# Patient Record
Sex: FEMALE | Race: BLACK OR AFRICAN AMERICAN | Employment: FULL TIME | URBAN - METROPOLITAN AREA
[De-identification: names, ages, dates, MRNs, and addresses within clinical notes are randomized per-mention and may not be internally consistent; named-entity substitution may affect disease eponyms.]

---

## 2018-02-14 ENCOUNTER — HOSPITAL ENCOUNTER (EMERGENCY)
Age: 28
Discharge: HOME | End: 2018-02-14
Attending: FAMILY MEDICINE

## 2018-02-14 VITALS
HEART RATE: 131 BPM | TEMPERATURE: 101.3 F | WEIGHT: 276 LBS | DIASTOLIC BLOOD PRESSURE: 66 MMHG | SYSTOLIC BLOOD PRESSURE: 120 MMHG | HEIGHT: 67 IN | RESPIRATION RATE: 18 BRPM | BODY MASS INDEX: 43.32 KG/M2 | OXYGEN SATURATION: 99 %

## 2018-02-14 DIAGNOSIS — J10.1 INFLUENZA B: ICD-10-CM

## 2018-02-14 DIAGNOSIS — J10.1 INFLUENZA A: Primary | ICD-10-CM

## 2018-02-14 LAB
INFLUENZA A AG (POC): ABNORMAL
INFLUENZA AG (POC) NEGATIVE CTRL.: ABNORMAL
INFLUENZA AG (POC) POSITIVE CTRL.: ABNORMAL
INFLUENZA B AG (POC): ABNORMAL
LOT NUMBER POC: ABNORMAL

## 2018-02-14 RX ORDER — OSELTAMIVIR PHOSPHATE 75 MG/1
75 CAPSULE ORAL 2 TIMES DAILY
Qty: 10 CAP | Refills: 0 | Status: SHIPPED | OUTPATIENT
Start: 2018-02-14 | End: 2018-02-19

## 2018-02-14 RX ORDER — BENZONATATE 200 MG/1
200 CAPSULE ORAL
Qty: 21 CAP | Refills: 0 | Status: SHIPPED | OUTPATIENT
Start: 2018-02-14 | End: 2018-02-21

## 2018-02-14 RX ORDER — IBUPROFEN 200 MG
800 TABLET ORAL
Status: COMPLETED | OUTPATIENT
Start: 2018-02-14 | End: 2018-02-14

## 2018-02-14 RX ADMIN — Medication 800 MG: at 12:52

## 2018-02-14 NOTE — UC PROVIDER NOTE
Patient is a 32 y.o. female presenting with cold symptoms. The history is provided by the patient. Cold Symptoms    This is a new problem. The current episode started yesterday. The problem has not changed since onset. There has been a fever of 101 - 101.9 F. The fever has been present for 1 - 2 days. Associated symptoms include congestion, headaches, rhinorrhea, sneezing and cough. She has tried nothing for the symptoms. Past Medical History:   Diagnosis Date    Allergic rhinitis, cause unspecified     Iron deficiency anemia     during pregnancy    Obesity, Class I, BMI 30-34.9         Past Surgical History:   Procedure Laterality Date    HX  SECTION      for fetal distress (Dr. Adrianna Verdin)         History reviewed. No pertinent family history. Social History     Social History    Marital status: UNKNOWN     Spouse name: N/A    Number of children: 1    Years of education: N/A     Occupational History    [employer] -       Social History Main Topics    Smoking status: Never Smoker    Smokeless tobacco: Never Used    Alcohol use Yes      Comment: social    Drug use: No    Sexual activity: Yes     Partners: Male     Birth control/ protection: IUD     Other Topics Concern    Not on file     Social History Narrative                ALLERGIES: Penicillins    Review of Systems   Constitutional: Positive for chills. HENT: Positive for congestion, rhinorrhea and sneezing. Respiratory: Positive for cough. Neurological: Positive for headaches. All other systems reviewed and are negative. Vitals:    18 1246   BP: 120/66   Pulse: (!) 131   Resp: 18   Temp: (!) 101.3 °F (38.5 °C)   SpO2: 99%   Weight: 125.2 kg (276 lb)   Height: 5' 7\" (1.702 m)       Physical Exam   Constitutional: No distress.    HENT:   Right Ear: Tympanic membrane and ear canal normal.   Left Ear: Tympanic membrane and ear canal normal.   Nose: Nose normal.   Mouth/Throat: No oropharyngeal exudate, posterior oropharyngeal edema or posterior oropharyngeal erythema. Eyes: Conjunctivae are normal. Right eye exhibits no discharge. Left eye exhibits no discharge. Neck: Neck supple. Pulmonary/Chest: Effort normal and breath sounds normal. No respiratory distress. She has no wheezes. She has no rales. Lymphadenopathy:     She has no cervical adenopathy. Skin: No rash noted. Nursing note and vitals reviewed. MDM     Differential Diagnosis; Clinical Impression; Plan:     CLINICAL IMPRESSION:  Influenza A  (primary encounter diagnosis)  Influenza B      DDX    Plan:    Tamiflu- other OTC Rx as needed  Fluids and rest   Amount and/or Complexity of Data Reviewed:   Clinical lab tests:  Ordered   Review and summarize past medical records:  Yes  Risk of Significant Complications, Morbidity, and/or Mortality:   Presenting problems: Moderate  Diagnostic procedures: Moderate  Management options:   Moderate  Progress:   Patient progress:  Stable      Procedures

## 2018-02-14 NOTE — DISCHARGE INSTRUCTIONS

## 2018-02-14 NOTE — LETTER
[address] [facility] [address] 
[phone] Work/School Note Date: 2/14/2018 To Whom It May concern: 
 
Rene Madison was seen and treated today in the urgent care center by the following provider(s): 
Attending Provider: Anastasia Ochoa MD.   
 
Rene Madison may return to work on 2/19/18. Sincerely, Anastasia Ochoa MD

## 2019-06-28 ENCOUNTER — APPOINTMENT (OUTPATIENT)
Dept: CT IMAGING | Age: 29
DRG: 815 | End: 2019-06-28
Attending: EMERGENCY MEDICINE
Payer: COMMERCIAL

## 2019-06-28 ENCOUNTER — HOSPITAL ENCOUNTER (INPATIENT)
Age: 29
LOS: 4 days | Discharge: HOME | DRG: 815 | End: 2019-07-02
Attending: EMERGENCY MEDICINE | Admitting: SURGERY
Payer: COMMERCIAL

## 2019-06-28 DIAGNOSIS — S36.039A LACERATION OF SPLEEN, INITIAL ENCOUNTER: Primary | ICD-10-CM

## 2019-06-28 DIAGNOSIS — E83.51 HYPOCALCEMIA: ICD-10-CM

## 2019-06-28 DIAGNOSIS — R07.9 ACUTE CHEST PAIN: ICD-10-CM

## 2019-06-28 DIAGNOSIS — D64.9 SYMPTOMATIC ANEMIA: ICD-10-CM

## 2019-06-28 DIAGNOSIS — R00.0 SINUS TACHYCARDIA: ICD-10-CM

## 2019-06-28 DIAGNOSIS — E87.6 ACUTE HYPOKALEMIA: ICD-10-CM

## 2019-06-28 LAB
ABO + RH BLD: NORMAL
ALBUMIN SERPL-MCNC: 3 G/DL (ref 3.5–5)
ALBUMIN/GLOB SERPL: 0.8 {RATIO} (ref 1.1–2.2)
ALP SERPL-CCNC: 127 U/L (ref 45–117)
ALT SERPL-CCNC: 207 U/L (ref 12–78)
AMPHET UR QL SCN: NEGATIVE
ANION GAP BLD CALC-SCNC: 19 MMOL/L (ref 10–20)
ANION GAP SERPL CALC-SCNC: 8 MMOL/L (ref 5–15)
APPEARANCE UR: ABNORMAL
AST SERPL-CCNC: 92 U/L (ref 15–37)
ATRIAL RATE: 128 BPM
BACTERIA URNS QL MICRO: NEGATIVE /HPF
BARBITURATES UR QL SCN: NEGATIVE
BASOPHILS # BLD: 0.1 K/UL (ref 0–0.1)
BASOPHILS NFR BLD: 1 % (ref 0–1)
BENZODIAZ UR QL: NEGATIVE
BILIRUB SERPL-MCNC: 0.5 MG/DL (ref 0.2–1)
BILIRUB UR QL CFM: NEGATIVE
BLOOD GROUP ANTIBODIES SERPL: NORMAL
BNP SERPL-MCNC: 20 PG/ML
BUN BLD-MCNC: 5 MG/DL (ref 9–20)
BUN SERPL-MCNC: 7 MG/DL (ref 6–20)
BUN/CREAT SERPL: 7 (ref 12–20)
CA-I BLD-MCNC: 1.13 MMOL/L (ref 1.12–1.32)
CALCIUM SERPL-MCNC: 8.1 MG/DL (ref 8.5–10.1)
CALCULATED P AXIS, ECG09: 38 DEGREES
CALCULATED R AXIS, ECG10: -24 DEGREES
CALCULATED T AXIS, ECG11: 9 DEGREES
CANNABINOIDS UR QL SCN: NEGATIVE
CHLORIDE BLD-SCNC: 98 MMOL/L (ref 98–107)
CHLORIDE SERPL-SCNC: 102 MMOL/L (ref 97–108)
CK SERPL-CCNC: 62 U/L (ref 26–192)
CO2 BLD-SCNC: 23 MMOL/L (ref 21–32)
CO2 SERPL-SCNC: 24 MMOL/L (ref 21–32)
COCAINE UR QL SCN: NEGATIVE
COLOR UR: ABNORMAL
COMMENT, HOLDF: NORMAL
CREAT BLD-MCNC: 0.9 MG/DL (ref 0.6–1.3)
CREAT SERPL-MCNC: 1 MG/DL (ref 0.55–1.02)
DIAGNOSIS, 93000: NORMAL
DIFFERENTIAL METHOD BLD: ABNORMAL
DRUG SCRN COMMENT,DRGCM: NORMAL
EOSINOPHIL # BLD: 0 K/UL (ref 0–0.4)
EOSINOPHIL NFR BLD: 0 % (ref 0–7)
EPITH CASTS URNS QL MICRO: ABNORMAL /LPF
ERYTHROCYTE [DISTWIDTH] IN BLOOD BY AUTOMATED COUNT: 13.8 % (ref 11.5–14.5)
GLOBULIN SER CALC-MCNC: 3.7 G/DL (ref 2–4)
GLUCOSE BLD-MCNC: 154 MG/DL (ref 65–100)
GLUCOSE SERPL-MCNC: 149 MG/DL (ref 65–100)
GLUCOSE UR STRIP.AUTO-MCNC: NEGATIVE MG/DL
HCG UR QL: NEGATIVE
HCT VFR BLD AUTO: 28.2 % (ref 35–47)
HCT VFR BLD AUTO: 32.5 % (ref 35–47)
HCT VFR BLD AUTO: 34.1 % (ref 35–47)
HCT VFR BLD CALC: 35 % (ref 35–47)
HETEROPH AB SER QL: NEGATIVE
HGB BLD-MCNC: 10 G/DL (ref 11.5–16)
HGB BLD-MCNC: 10.7 G/DL (ref 11.5–16)
HGB BLD-MCNC: 11.3 G/DL (ref 11.5–16)
HGB BLD-MCNC: 9.2 G/DL (ref 11.5–16)
HGB UR QL STRIP: NEGATIVE
IMM GRANULOCYTES # BLD AUTO: 0 K/UL (ref 0–0.04)
IMM GRANULOCYTES NFR BLD AUTO: 0 % (ref 0–0.5)
KETONES UR QL STRIP.AUTO: ABNORMAL MG/DL
LACTATE SERPL-SCNC: 1.4 MMOL/L (ref 0.4–2)
LEUKOCYTE ESTERASE UR QL STRIP.AUTO: ABNORMAL
LIPASE SERPL-CCNC: 58 U/L (ref 73–393)
LYMPHOCYTES # BLD: 3.6 K/UL (ref 0.8–3.5)
LYMPHOCYTES NFR BLD: 35 % (ref 12–49)
MCH RBC QN AUTO: 27.6 PG (ref 26–34)
MCHC RBC AUTO-ENTMCNC: 33.1 G/DL (ref 30–36.5)
MCV RBC AUTO: 83.2 FL (ref 80–99)
METHADONE UR QL: NEGATIVE
MONOCYTES # BLD: 0.7 K/UL (ref 0–1)
MONOCYTES NFR BLD: 7 % (ref 5–13)
NEUTS BAND NFR BLD MANUAL: 1 %
NEUTS SEG # BLD: 5.8 K/UL (ref 1.8–8)
NEUTS SEG NFR BLD: 56 % (ref 32–75)
NITRITE UR QL STRIP.AUTO: NEGATIVE
NRBC # BLD: 0 K/UL (ref 0–0.01)
NRBC BLD-RTO: 0 PER 100 WBC
OPIATES UR QL: NEGATIVE
P-R INTERVAL, ECG05: 122 MS
PCP UR QL: NEGATIVE
PH UR STRIP: 6 [PH] (ref 5–8)
PLATELET # BLD AUTO: 134 K/UL (ref 150–400)
POTASSIUM BLD-SCNC: 3.2 MMOL/L (ref 3.5–5.1)
POTASSIUM SERPL-SCNC: 3.2 MMOL/L (ref 3.5–5.1)
PROT SERPL-MCNC: 6.7 G/DL (ref 6.4–8.2)
PROT UR STRIP-MCNC: 30 MG/DL
Q-T INTERVAL, ECG07: 312 MS
QRS DURATION, ECG06: 72 MS
QTC CALCULATION (BEZET), ECG08: 455 MS
RBC # BLD AUTO: 4.1 M/UL (ref 3.8–5.2)
RBC #/AREA URNS HPF: ABNORMAL /HPF (ref 0–5)
RBC MORPH BLD: ABNORMAL
SAMPLES BEING HELD,HOLD: NORMAL
SERVICE CMNT-IMP: ABNORMAL
SODIUM BLD-SCNC: 136 MMOL/L (ref 136–145)
SODIUM SERPL-SCNC: 134 MMOL/L (ref 136–145)
SP GR UR REFRACTOMETRY: 1.02 (ref 1–1.03)
SPECIMEN EXP DATE BLD: NORMAL
TROPONIN I SERPL-MCNC: <0.05 NG/ML
TSH SERPL DL<=0.05 MIU/L-ACNC: 1.83 UIU/ML (ref 0.36–3.74)
UA: UC IF INDICATED,UAUC: ABNORMAL
UROBILINOGEN UR QL STRIP.AUTO: 1 EU/DL (ref 0.2–1)
VENTRICULAR RATE, ECG03: 128 BPM
WBC # BLD AUTO: 10.2 K/UL (ref 3.6–11)
WBC MORPH BLD: ABNORMAL
WBC URNS QL MICRO: ABNORMAL /HPF (ref 0–4)

## 2019-06-28 PROCEDURE — 96361 HYDRATE IV INFUSION ADD-ON: CPT

## 2019-06-28 PROCEDURE — 74011636320 HC RX REV CODE- 636/320: Performed by: EMERGENCY MEDICINE

## 2019-06-28 PROCEDURE — 80307 DRUG TEST PRSMV CHEM ANLYZR: CPT

## 2019-06-28 PROCEDURE — 74011250636 HC RX REV CODE- 250/636: Performed by: SURGERY

## 2019-06-28 PROCEDURE — 86308 HETEROPHILE ANTIBODY SCREEN: CPT

## 2019-06-28 PROCEDURE — 74011250636 HC RX REV CODE- 250/636: Performed by: EMERGENCY MEDICINE

## 2019-06-28 PROCEDURE — 80053 COMPREHEN METABOLIC PANEL: CPT

## 2019-06-28 PROCEDURE — 74011250637 HC RX REV CODE- 250/637: Performed by: INTERNAL MEDICINE

## 2019-06-28 PROCEDURE — 74011250637 HC RX REV CODE- 250/637: Performed by: EMERGENCY MEDICINE

## 2019-06-28 PROCEDURE — 96374 THER/PROPH/DIAG INJ IV PUSH: CPT

## 2019-06-28 PROCEDURE — 74174 CTA ABD&PLVS W/CONTRAST: CPT

## 2019-06-28 PROCEDURE — 85025 COMPLETE CBC W/AUTO DIFF WBC: CPT

## 2019-06-28 PROCEDURE — 93005 ELECTROCARDIOGRAM TRACING: CPT

## 2019-06-28 PROCEDURE — 83605 ASSAY OF LACTIC ACID: CPT

## 2019-06-28 PROCEDURE — 84484 ASSAY OF TROPONIN QUANT: CPT

## 2019-06-28 PROCEDURE — 82550 ASSAY OF CK (CPK): CPT

## 2019-06-28 PROCEDURE — 84443 ASSAY THYROID STIM HORMONE: CPT

## 2019-06-28 PROCEDURE — 80047 BASIC METABLC PNL IONIZED CA: CPT

## 2019-06-28 PROCEDURE — 99285 EMERGENCY DEPT VISIT HI MDM: CPT

## 2019-06-28 PROCEDURE — 86777 TOXOPLASMA ANTIBODY: CPT

## 2019-06-28 PROCEDURE — 74011250637 HC RX REV CODE- 250/637: Performed by: SURGERY

## 2019-06-28 PROCEDURE — 83690 ASSAY OF LIPASE: CPT

## 2019-06-28 PROCEDURE — 71275 CT ANGIOGRAPHY CHEST: CPT

## 2019-06-28 PROCEDURE — 81001 URINALYSIS AUTO W/SCOPE: CPT

## 2019-06-28 PROCEDURE — 36415 COLL VENOUS BLD VENIPUNCTURE: CPT

## 2019-06-28 PROCEDURE — 83880 ASSAY OF NATRIURETIC PEPTIDE: CPT

## 2019-06-28 PROCEDURE — 85018 HEMOGLOBIN: CPT

## 2019-06-28 PROCEDURE — 81025 URINE PREGNANCY TEST: CPT

## 2019-06-28 PROCEDURE — 65610000006 HC RM INTENSIVE CARE

## 2019-06-28 PROCEDURE — 86900 BLOOD TYPING SEROLOGIC ABO: CPT

## 2019-06-28 RX ORDER — DEXTROSE, SODIUM CHLORIDE, AND POTASSIUM CHLORIDE 5; .45; .15 G/100ML; G/100ML; G/100ML
125 INJECTION INTRAVENOUS CONTINUOUS
Status: DISCONTINUED | OUTPATIENT
Start: 2019-06-28 | End: 2019-06-28

## 2019-06-28 RX ORDER — POTASSIUM CHLORIDE 750 MG/1
40 TABLET, FILM COATED, EXTENDED RELEASE ORAL
Status: COMPLETED | OUTPATIENT
Start: 2019-06-28 | End: 2019-06-28

## 2019-06-28 RX ORDER — POTASSIUM CHLORIDE 7.45 MG/ML
10 INJECTION INTRAVENOUS
Status: COMPLETED | OUTPATIENT
Start: 2019-06-28 | End: 2019-06-28

## 2019-06-28 RX ORDER — SODIUM CHLORIDE 0.9 % (FLUSH) 0.9 %
5-40 SYRINGE (ML) INJECTION EVERY 8 HOURS
Status: DISCONTINUED | OUTPATIENT
Start: 2019-06-28 | End: 2019-07-02 | Stop reason: HOSPADM

## 2019-06-28 RX ORDER — LORAZEPAM 2 MG/ML
1 INJECTION INTRAMUSCULAR ONCE
Status: COMPLETED | OUTPATIENT
Start: 2019-06-28 | End: 2019-06-28

## 2019-06-28 RX ORDER — CYCLOBENZAPRINE HCL 10 MG
10 TABLET ORAL
Status: COMPLETED | OUTPATIENT
Start: 2019-06-28 | End: 2019-06-28

## 2019-06-28 RX ORDER — ONDANSETRON 2 MG/ML
4 INJECTION INTRAMUSCULAR; INTRAVENOUS
Status: DISCONTINUED | OUTPATIENT
Start: 2019-06-28 | End: 2019-07-02 | Stop reason: HOSPADM

## 2019-06-28 RX ORDER — OXYCODONE AND ACETAMINOPHEN 5; 325 MG/1; MG/1
2 TABLET ORAL
Status: DISCONTINUED | OUTPATIENT
Start: 2019-06-28 | End: 2019-07-02 | Stop reason: HOSPADM

## 2019-06-28 RX ORDER — NALOXONE HYDROCHLORIDE 0.4 MG/ML
0.4 INJECTION, SOLUTION INTRAMUSCULAR; INTRAVENOUS; SUBCUTANEOUS AS NEEDED
Status: DISCONTINUED | OUTPATIENT
Start: 2019-06-28 | End: 2019-07-02 | Stop reason: HOSPADM

## 2019-06-28 RX ORDER — SODIUM CHLORIDE 0.9 % (FLUSH) 0.9 %
10 SYRINGE (ML) INJECTION
Status: COMPLETED | OUTPATIENT
Start: 2019-06-28 | End: 2019-06-28

## 2019-06-28 RX ORDER — DIPHENHYDRAMINE HYDROCHLORIDE 50 MG/ML
50 INJECTION, SOLUTION INTRAMUSCULAR; INTRAVENOUS
Status: DISCONTINUED | OUTPATIENT
Start: 2019-06-28 | End: 2019-07-02 | Stop reason: HOSPADM

## 2019-06-28 RX ORDER — SODIUM CHLORIDE 0.9 % (FLUSH) 0.9 %
5-40 SYRINGE (ML) INJECTION AS NEEDED
Status: DISCONTINUED | OUTPATIENT
Start: 2019-06-28 | End: 2019-07-02 | Stop reason: HOSPADM

## 2019-06-28 RX ORDER — POTASSIUM CHLORIDE 750 MG/1
10 TABLET, FILM COATED, EXTENDED RELEASE ORAL 2 TIMES DAILY
Status: DISCONTINUED | OUTPATIENT
Start: 2019-06-28 | End: 2019-06-29 | Stop reason: SDUPTHER

## 2019-06-28 RX ORDER — HYDROMORPHONE HYDROCHLORIDE 1 MG/ML
0.5 INJECTION, SOLUTION INTRAMUSCULAR; INTRAVENOUS; SUBCUTANEOUS
Status: DISCONTINUED | OUTPATIENT
Start: 2019-06-28 | End: 2019-06-28

## 2019-06-28 RX ORDER — LORAZEPAM 2 MG/ML
1 INJECTION INTRAMUSCULAR
Status: DISCONTINUED | OUTPATIENT
Start: 2019-06-28 | End: 2019-06-28

## 2019-06-28 RX ORDER — SODIUM CHLORIDE 9 MG/ML
125 INJECTION, SOLUTION INTRAVENOUS CONTINUOUS
Status: DISCONTINUED | OUTPATIENT
Start: 2019-06-28 | End: 2019-06-30

## 2019-06-28 RX ADMIN — SODIUM CHLORIDE 125 ML/HR: 900 INJECTION, SOLUTION INTRAVENOUS at 08:34

## 2019-06-28 RX ADMIN — IOPAMIDOL 80 ML: 755 INJECTION, SOLUTION INTRAVENOUS at 05:20

## 2019-06-28 RX ADMIN — Medication 10 ML: at 21:15

## 2019-06-28 RX ADMIN — POTASSIUM CHLORIDE 40 MEQ: 750 TABLET, FILM COATED, EXTENDED RELEASE ORAL at 10:45

## 2019-06-28 RX ADMIN — CYCLOBENZAPRINE HYDROCHLORIDE 10 MG: 10 TABLET, FILM COATED ORAL at 04:19

## 2019-06-28 RX ADMIN — Medication 10 ML: at 12:03

## 2019-06-28 RX ADMIN — OXYCODONE AND ACETAMINOPHEN 2 TABLET: 5; 325 TABLET ORAL at 10:45

## 2019-06-28 RX ADMIN — POTASSIUM CHLORIDE 10 MEQ: 10 INJECTION, SOLUTION INTRAVENOUS at 12:03

## 2019-06-28 RX ADMIN — SODIUM CHLORIDE 1000 ML: 900 INJECTION, SOLUTION INTRAVENOUS at 06:13

## 2019-06-28 RX ADMIN — POTASSIUM CHLORIDE 10 MEQ: 750 TABLET, FILM COATED, EXTENDED RELEASE ORAL at 18:16

## 2019-06-28 RX ADMIN — Medication 10 ML: at 05:19

## 2019-06-28 RX ADMIN — Medication 30 ML: at 10:46

## 2019-06-28 RX ADMIN — LORAZEPAM 1 MG: 2 INJECTION INTRAMUSCULAR; INTRAVENOUS at 04:19

## 2019-06-28 RX ADMIN — OXYCODONE AND ACETAMINOPHEN 2 TABLET: 5; 325 TABLET ORAL at 18:16

## 2019-06-28 RX ADMIN — SODIUM CHLORIDE 125 ML/HR: 900 INJECTION, SOLUTION INTRAVENOUS at 21:16

## 2019-06-28 RX ADMIN — SODIUM CHLORIDE 1000 ML: 900 INJECTION, SOLUTION INTRAVENOUS at 04:19

## 2019-06-28 RX ADMIN — Medication 1 AMPULE: at 21:15

## 2019-06-28 RX ADMIN — Medication 10 ML: at 06:11

## 2019-06-28 RX ADMIN — POTASSIUM CHLORIDE 10 MEQ: 10 INJECTION, SOLUTION INTRAVENOUS at 10:45

## 2019-06-28 RX ADMIN — Medication 10 ML: at 18:17

## 2019-06-28 RX ADMIN — POTASSIUM CHLORIDE 10 MEQ: 750 TABLET, FILM COATED, EXTENDED RELEASE ORAL at 12:00

## 2019-06-28 RX ADMIN — IOPAMIDOL 100 ML: 755 INJECTION, SOLUTION INTRAVENOUS at 06:11

## 2019-06-28 NOTE — H&P
Surgery Consult    Subjective:      Viktor Burrows is a 34 y.o. female who is being seen for evaluation of abdominal pain. The pain is located in the LUQ  . Pain is described as dull and aching and measures 10/10 in intensity. Onset of pain was 1 day ago. Aggravating factors include movement, activity and pressure. Alleviating factors include none. Associated symptoms include shortness of breath. Patient states she had N/V last week that was severe. She suffered a ground level fall during a vomiting episode last weeks. Patient Active Problem List    Diagnosis Date Noted    Splenic laceration 2019    Low back strain 2014    Insomnia 2012    Obesity, Class I, BMI 30-34.9 2012    Vitamin D deficiency 2012    Tachycardia 2012     Past Medical History:   Diagnosis Date    Allergic rhinitis, cause unspecified     Iron deficiency anemia     during pregnancy    Obesity, Class I, BMI 30-34.9       Past Surgical History:   Procedure Laterality Date    HX  SECTION      for fetal distress (Dr. Vikash Interiano)      Social History     Tobacco Use    Smoking status: Never Smoker    Smokeless tobacco: Never Used   Substance Use Topics    Alcohol use: Yes     Comment: social      No family history on file.    Current Facility-Administered Medications   Medication Dose Route Frequency    sodium chloride (NS) flush 5-40 mL  5-40 mL IntraVENous Q8H    sodium chloride (NS) flush 5-40 mL  5-40 mL IntraVENous PRN    naloxone (NARCAN) injection 0.4 mg  0.4 mg IntraVENous PRN    ondansetron (ZOFRAN) injection 4 mg  4 mg IntraVENous Q4H PRN    diphenhydrAMINE (BENADRYL) injection 50 mg  50 mg IntraVENous Q6H PRN    0.9% sodium chloride infusion  125 mL/hr IntraVENous CONTINUOUS    oxyCODONE-acetaminophen (PERCOCET) 5-325 mg per tablet 2 Tab  2 Tab Oral Q4H PRN    potassium chloride SR (KLOR-CON 10) tablet 40 mEq  40 mEq Oral NOW      Allergies   Allergen Reactions    Penicillins Rash       Review of Systems:    A comprehensive review of systems was negative except for that written in the History of Present Illness. Objective:        Visit Vitals  /77   Pulse (!) 117   Temp 99.1 °F (37.3 °C)   Resp 18   Ht 5' 7\" (1.702 m)   Wt 124.7 kg (274 lb 14.6 oz)   SpO2 100%   BMI 43.06 kg/m²       Physical Exam:  GENERAL: alert, cooperative, no distress, appears stated age, EYE: conjunctivae/corneas clear. , EOM's intact., LUNG: clear to auscultation bilaterally, HEART: regular rate and rhythm, S1, S2 normal, no murmur, click, rub or gallop, ABDOMEN: soft, non-tender. Bowel sounds normal. No masses,  no organomegaly    Imaging:  images and reports reviewed    Lab/Data Review:  BMP:   Lab Results   Component Value Date/Time     (L) 06/28/2019 04:08 AM    K 3.2 (L) 06/28/2019 04:08 AM     06/28/2019 04:08 AM    CO2 24 06/28/2019 04:08 AM    AGAP 8 06/28/2019 04:08 AM     (H) 06/28/2019 04:08 AM    BUN 7 06/28/2019 04:08 AM    CREA 1.00 06/28/2019 04:08 AM    GFRAA >60 06/28/2019 04:08 AM    GFRNA >60 06/28/2019 04:08 AM     CBC:   Lab Results   Component Value Date/Time    WBC 10.2 06/28/2019 04:08 AM    HGB 10.7 (L) 06/28/2019 05:43 AM    HCT 32.5 (L) 06/28/2019 05:43 AM     (L) 06/28/2019 04:08 AM         Assessment:     34year old with splenic laceration without active extravasation. Has some tachycardia but is otherwise stable. Plan:     1. I recommend proceeding with bed rest, observation, and serial hemoglobins.

## 2019-06-28 NOTE — ROUTINE PROCESS
Report given to [facility]. SBAR reviewed. Opportunity provided for questions. Preparing for transfer to room.

## 2019-06-28 NOTE — PROGRESS NOTES
Critical care interdisciplinary rounds held on ******(date). Following members present, Pharmacy, Diabetes Treatment, Case Management, Respiratory Therapy, Physical Therapy, Pastoral Care, Heart Failure Management, Palliative Care and Nutrition. Led by ******** and  ................... Karol Morrow Plan of care discussed. See clinical pathway for plan of care and interventions and desired outcomes.

## 2019-06-28 NOTE — PROGRESS NOTES
6/28/2019    INTENSIVIST PROGRESS NOTE:     Patient seen and evaluated, chart reviewed   35 yo female presented with left flank pain after a fall a few days ago  Found to have a splenic laceration  Admitted to ccu for observation  Now pt in CCU in no distress  No acute complaints    ROS: no sob, no cp, left flank pain    Visit Vitals  /77   Pulse (!) 117   Temp 99.1 °F (37.3 °C)   Resp 18   Ht 5' 7\" (1.702 m)   Wt 124.7 kg (274 lb 14.6 oz)   SpO2 100%   BMI 43.06 kg/m²       General: no distress  Eyes: anicteric  HEENT: dry oral mucosa  Neck: FROM  CV: RRR  Lungs: clear  Abd: soft  : no hematuria  Ext: no edema  Skin: no rash  Musculoskeletal: normal inspection  Neuro: non focal    CXR: clear    Chest ct: no asdz    Labs reviewed    A/P:  - splenic laceration; no surgical intervention, serial h/h  - pain control  - clear liquid diet  - monitor in ccu overnight  - Will assist on disposition planning when stable for transfer  Servando Arellano MD

## 2019-06-28 NOTE — PROGRESS NOTES
Reason for Admission:   Splenic laceration                   RRAT Score:          0 low risk           Plan for utilizing home health:      TBD                    Current Advanced Directive/Advance Care Plan: none                         Transition of Care Plan:             1.  Patient in ED bed waiting for inpatient admission  2. Patient prefers to discharge home with family assistance and follow up appointments    Patient is a 34year old female admitted 6/28 for splenic laceration. Patient alert and oriented, resting in bed, no visitors present in room. Demographic information verified and correct. Insurance information verified and correct. Patient lives alone, no home oxygen, no DME, has not used home health in the past.  Patient uses "[facility]" pharamacy on [address]. Patient states she is independent with ADLs and can drive. Patient's father can transport at discharge    Care Management Interventions  PCP Verified by CM: Yes(Byron Sierra DO)  Mode of Transport at Discharge:  Other (see comment)(pt's father can transport at [employer])  Transition of Care Consult (CM Consult): Discharge Planning  Discharge Durable Medical Equipment: No  Physical Therapy Consult: No  Occupational Therapy Consult: No  Speech Therapy Consult: No  Current Support Network: Own Home, Lives Alone(2nd floor apartment, stairs to access)  Confirm Follow Up Transport: Family  Plan discussed with Pt/Family/Caregiver: Yes  Discharge Location  Discharge Placement: [address], RN, [address] [place] [address]  [phone]

## 2019-06-28 NOTE — PROGRESS NOTES
1900- Bedside report received from KASSIE Mccloud    2000- Assessment completed, see charting for details. Patient in bed watching tv and on her phone. Denies pain at this time. Patient turns self. 2200- BP lower the last 2 hours. Talked to charge nurse. Will draw Hgb to see where we are at. Patient is asymptomatic. Still tachycardic    Gown changed, IV leaking and bleeding at the site. Changed fluids over to the other arm and retook BP. SBP 97 now. Will continue to monitor and see results of Hbg that was just sent. 2300- /58.     0000- Assessment completed, see charting for details. Patient sleeping    0108- PRN Percocet given for pain    0530- Critical Ca 6.0. Dr. Patrice Goon called and made aware. No orders received at this time. Waiting for morning rounds for orders. 0630- Bladder scan 317. Asked patient to try to pee and she says she doesn't feel like she has to go right now, but she will try. Will pass on to day shift to scan later if pt has not urinated.      0700- Bedside report given to Gilberto Starkey RN

## 2019-06-28 NOTE — ED PROVIDER NOTES
EMERGENCY DEPARTMENT HISTORY AND PHYSICAL EXAM      Date: 2019  Patient Name: Aida Almonte  Patient Age and Sex: 34 y.o. female    History of Presenting Illness     Chief Complaint   Patient presents with    Shortness of Breath     Seh ahs been feeling bad all week - cold like symptoms - and this evening she has been having trouble with SOb, and left sided chest pain under her left breast. HR in triage 130 to 140's. She ahd an episode where she passed out around midnight tonight.  Chest Pain    Dizziness       History Provided By: Patient    HPI: Aida Almonte, 34 y.o. female with PMHx significant for below presents to the ED with c/o of acute onset of SOB and left sided chest pain under her \"left breast\" onset about two hours PTA. Pt reports passing out tonight as well. Pt states she has been feeling ill all week and had URI sx's last week and also had vomiting and body aches. Tonight she reports the pain at severe intensity, 10/10 and localized left chest. She reports pain with movement and inspiration. Pt denies any other alleviating or exacerbating factors. Additionally, pt specifically denies any recent fever, chills, headache, nausea, vomiting, abdominal pain, lightheadedness, dizziness, numbness, weakness, BLE swelling, heart palpitations, urinary sxs, diarrhea, constipation, melena, hematochezia, cough, or congestion. PCP: Sade Britton DO    There are no other complaints, changes or physical findings at this time.      Past History   Past Medical History:  Past Medical History:   Diagnosis Date    Allergic rhinitis, cause unspecified     Iron deficiency anemia     during pregnancy    Obesity, Class I, BMI 30-34.9        Past Surgical History:  Past Surgical History:   Procedure Laterality Date    HX  SECTION      for fetal distress (Dr. Raheem Mccord)       Family History:  Denies    Social History:  Social History     Tobacco Use    Smoking status: Never Smoker    Smokeless tobacco: Never Used   Substance Use Topics    Alcohol use: Yes     Comment: social    Drug use: No       Allergies: Allergies   Allergen Reactions    Penicillins Rash       Current Medications:  No current facility-administered medications on file prior to encounter. No current outpatient medications on file prior to encounter. Review of Systems   Review of Systems   Constitutional: Positive for fatigue. Negative for chills and fever. HENT: Negative. Negative for congestion, facial swelling, rhinorrhea, sore throat, trouble swallowing and voice change. Eyes: Negative. Respiratory: Positive for shortness of breath. Negative for apnea, cough, chest tightness and wheezing. Cardiovascular: Positive for chest pain. Negative for palpitations and leg swelling. Gastrointestinal: Negative. Negative for abdominal distention, abdominal pain, blood in stool, constipation, diarrhea, nausea and vomiting. Endocrine: Negative. Negative for cold intolerance, heat intolerance and polyuria. Genitourinary: Negative. Negative for difficulty urinating, dysuria, flank pain, frequency, hematuria and urgency. Musculoskeletal: Negative. Negative for arthralgias, back pain, myalgias, neck pain and neck stiffness. Skin: Negative. Negative for color change and rash. Neurological: Positive for syncope and weakness. Negative for dizziness, facial asymmetry, speech difficulty, light-headedness, numbness and headaches. Hematological: Negative. Does not bruise/bleed easily. Psychiatric/Behavioral: Negative. Negative for confusion and self-injury. The patient is not nervous/anxious. Physical Exam   Physical Exam   Constitutional: She is oriented to person, place, and time. She appears well-developed and well-nourished. She appears toxic. She has a sickly appearance. She appears ill. She appears distressed. HENT:   Head: Normocephalic and atraumatic.    Mouth/Throat: Oropharynx is clear and moist. No oropharyngeal exudate. Eyes: Pupils are equal, round, and reactive to light. Conjunctivae and EOM are normal.   Neck: Normal range of motion. Cardiovascular: Regular rhythm and normal heart sounds. Tachycardia present. Exam reveals no gallop and no friction rub. No murmur heard. Pulmonary/Chest: Effort normal and breath sounds normal. No respiratory distress. She has no wheezes. She has no rales. She exhibits no tenderness. Abdominal: Soft. Bowel sounds are normal. She exhibits no distension and no mass. There is no tenderness. There is no rebound and no guarding. Musculoskeletal: Normal range of motion. She exhibits no edema, tenderness or deformity. Neurological: She is alert and oriented to person, place, and time. She displays normal reflexes. No cranial nerve deficit. She exhibits normal muscle tone. Coordination normal.   Skin: Skin is warm. No rash noted. She is not diaphoretic. Psychiatric: She has a normal mood and affect. Nursing note and vitals reviewed.       Diagnostic Study Results     Labs -  Recent Results (from the past 24 hour(s))   EKG, 12 LEAD, INITIAL    Collection Time: 06/28/19  3:57 AM   Result Value Ref Range    Ventricular Rate 128 BPM    Atrial Rate 128 BPM    P-R Interval 122 ms    QRS Duration 72 ms    Q-T Interval 312 ms    QTC Calculation (Bezet) 455 ms    Calculated P Axis 38 degrees    Calculated R Axis -24 degrees    Calculated T Axis 9 degrees    Diagnosis Sinus tachycardia  No previous ECGs available      URINALYSIS W/ REFLEX CULTURE    Collection Time: 06/28/19  4:02 AM   Result Value Ref Range    Color DARK YELLOW      Appearance CLOUDY (A) CLEAR      Specific gravity 1.017 1.003 - 1.030      pH (UA) 6.0 5.0 - 8.0      Protein 30 (A) NEG mg/dL    Glucose NEGATIVE  NEG mg/dL    Ketone TRACE (A) NEG mg/dL    Blood NEGATIVE  NEG      Urobilinogen 1.0 0.2 - 1.0 EU/dL    Nitrites NEGATIVE  NEG      Leukocyte Esterase SMALL (A) NEG WBC 0-4 0 - 4 /hpf    RBC 0-5 0 - 5 /hpf    Epithelial cells MANY (A) FEW /lpf    Bacteria NEGATIVE  NEG /hpf    UA:UC IF INDICATED CULTURE NOT INDICATED BY UA RESULT CNI     DRUG SCREEN, URINE    Collection Time: 06/28/19  4:02 AM   Result Value Ref Range    AMPHETAMINES NEGATIVE  NEG      BARBITURATES NEGATIVE  NEG      BENZODIAZEPINES NEGATIVE  NEG      COCAINE NEGATIVE  NEG      METHADONE NEGATIVE  NEG      OPIATES NEGATIVE  NEG      PCP(PHENCYCLIDINE) NEGATIVE  NEG      THC (TH-CANNABINOL) NEGATIVE  NEG      Drug screen comment (NOTE)    BILIRUBIN, CONFIRM    Collection Time: 06/28/19  4:02 AM   Result Value Ref Range    Bilirubin UA, confirm NEGATIVE  NEG     CBC WITH AUTOMATED DIFF    Collection Time: 06/28/19  4:08 AM   Result Value Ref Range    WBC 10.2 3.6 - 11.0 K/uL    RBC 4.10 3.80 - 5.20 M/uL    HGB 11.3 (L) 11.5 - 16.0 g/dL    HCT 34.1 (L) 35.0 - 47.0 %    MCV 83.2 80.0 - 99.0 FL    MCH 27.6 26.0 - 34.0 PG    MCHC 33.1 30.0 - 36.5 g/dL    RDW 13.8 11.5 - 14.5 %    PLATELET 859 (L) 851 - 400 K/uL    NRBC 0.0 0  WBC    ABSOLUTE NRBC 0.00 0.00 - 0.01 K/uL    NEUTROPHILS 56 32 - 75 %    BAND NEUTROPHILS 1 %    LYMPHOCYTES 35 12 - 49 %    MONOCYTES 7 5 - 13 %    EOSINOPHILS 0 0 - 7 %    BASOPHILS 1 0 - 1 %    IMMATURE GRANULOCYTES 0 0.0 - 0.5 %    ABS. NEUTROPHILS 5.8 1.8 - 8.0 K/UL    ABS. LYMPHOCYTES 3.6 (H) 0.8 - 3.5 K/UL    ABS. MONOCYTES 0.7 0.0 - 1.0 K/UL    ABS. EOSINOPHILS 0.0 0.0 - 0.4 K/UL    ABS. BASOPHILS 0.1 0.0 - 0.1 K/UL    ABS. IMM.  GRANS. 0.0 0.00 - 0.04 K/UL    DF AUTOMATED      RBC COMMENTS NORMOCYTIC, NORMOCHROMIC      WBC COMMENTS REACTIVE LYMPHS     METABOLIC PANEL, COMPREHENSIVE    Collection Time: 06/28/19  4:08 AM   Result Value Ref Range    Sodium 134 (L) 136 - 145 mmol/L    Potassium 3.2 (L) 3.5 - 5.1 mmol/L    Chloride 102 97 - 108 mmol/L    CO2 24 21 - 32 mmol/L    Anion gap 8 5 - 15 mmol/L    Glucose 149 (H) 65 - 100 mg/dL    BUN 7 6 - 20 MG/DL    Creatinine 1.00 0.55 - 1.02 MG/DL    BUN/Creatinine ratio 7 (L) 12 - 20      GFR est AA >60 >60 ml/min/1.73m2    GFR est non-AA >60 >60 ml/min/1.73m2    Calcium 8.1 (L) 8.5 - 10.1 MG/DL    Bilirubin, total 0.5 0.2 - 1.0 MG/DL    ALT (SGPT) 207 (H) 12 - 78 U/L    AST (SGOT) 92 (H) 15 - 37 U/L    Alk. phosphatase 127 (H) 45 - 117 U/L    Protein, total 6.7 6.4 - 8.2 g/dL    Albumin 3.0 (L) 3.5 - 5.0 g/dL    Globulin 3.7 2.0 - 4.0 g/dL    A-G Ratio 0.8 (L) 1.1 - 2.2     NT-PRO BNP    Collection Time: 06/28/19  4:08 AM   Result Value Ref Range    NT pro-BNP 20 <125 PG/ML   TROPONIN I    Collection Time: 06/28/19  4:08 AM   Result Value Ref Range    Troponin-I, Qt. <0.05 <0.05 ng/mL   CK W/ REFLX CKMB    Collection Time: 06/28/19  4:08 AM   Result Value Ref Range    CK 62 26 - 192 U/L   SAMPLES BEING HELD    Collection Time: 06/28/19  4:08 AM   Result Value Ref Range    SAMPLES BEING HELD RD BL     COMMENT        Add-on orders for these samples will be processed based on acceptable specimen integrity and analyte stability, which may vary by analyte. TSH 3RD GENERATION    Collection Time: 06/28/19  4:08 AM   Result Value Ref Range    TSH 1.83 0.36 - 3.74 uIU/mL   LIPASE    Collection Time: 06/28/19  4:08 AM   Result Value Ref Range    Lipase 58 (L) 73 - 393 U/L   POC CHEM8    Collection Time: 06/28/19  4:26 AM   Result Value Ref Range    Calcium, ionized (POC) 1.13 1.12 - 1.32 mmol/L    Sodium (POC) 136 136 - 145 mmol/L    Potassium (POC) 3.2 (L) 3.5 - 5.1 mmol/L    Chloride (POC) 98 98 - 107 mmol/L    CO2 (POC) 23 21 - 32 mmol/L    Anion gap (POC) 19 10 - 20 mmol/L    Glucose (POC) 154 (H) 65 - 100 mg/dL    BUN (POC) 5 (L) 9 - 20 mg/dL    Creatinine (POC) 0.9 0.6 - 1.3 mg/dL    GFRAA, POC >60 >60 ml/min/1.73m2    GFRNA, POC >60 >60 ml/min/1.73m2    Hematocrit (POC) 35 35.0 - 47.0 %    Comment Comment Not Indicated.      TYPE & SCREEN    Collection Time: 06/28/19  5:43 AM   Result Value Ref Range    Crossmatch Expiration 07/01/2019 ABO/Rh(D) A POSITIVE     Antibody screen NEG    HGB & HCT    Collection Time: 06/28/19  5:43 AM   Result Value Ref Range    HGB 10.7 (L) 11.5 - 16.0 g/dL    HCT 32.5 (L) 35.0 - 47.0 %   LACTIC ACID    Collection Time: 06/28/19  6:14 AM   Result Value Ref Range    Lactic acid 1.4 0.4 - 2.0 MMOL/L   INFECTIOUS MONO PANEL    Collection Time: 06/28/19  6:24 AM   Result Value Ref Range    EBV Ab VCA, IgG PENDING U/mL    EBV Ab VCA, IgM PENDING U/mL    Cytomegalovirus Ab, IgG PENDING U/mL    CMV Ab, IgM PENDING AU/mL    Toxoplasma gondii AB, IgG, QT PENDING IU/mL    Toxoplasma gondii Ab, IgM, QT PENDING AU/mL   MONONUCLEOSIS SCREEN    Collection Time: 06/28/19  6:24 AM   Result Value Ref Range    Mononucleosis screen NEGATIVE  NEG         Radiologic Studies -   CTA ABD PELV W WO CONT   Final Result   IMPRESSION:    There is no evidence of active extravasation. Splenomegaly with perisplenic crescent of hyperdense fluid. Hyperdense/hemorrhagic fluid extending into the paracolic gutter and into the   pelvis also noted in the right paracolic cutter. Consider spontaneous    perisplenic hemorrhage. No rib fracture or reported history of trauma in this    patient. Continued hemodynamic monitoring is recommended. CTA CHEST W OR W WO CONT   Final Result   IMPRESSION:    There is no proximal pulmonary embolism. There is no aortic aneurysm or dissection. Splenomegaly with perisplenic crescent of hyperdense fluid. Consider spontaneous   perisplenic hemorrhage. No rib fracture or reported history of trauma in this   patient. Hemodynamic monitoring is recommended. CT angiography arterial phase imaging of the abdomen is recommended to evaluate   for possible splenic laceration/splenic injury which is not apparent on this   field-of-view. The findings were called to Dr. Estelle Rodriguez on 6/28/2019 at 5:25 AM. by Dr. Christ Aase.   [id number]        CT Results  (Last 48 hours)               06/28/19 0611  CTA ABD PELV W WO CONT Final result    Impression:  IMPRESSION:    There is no evidence of active extravasation. Splenomegaly with perisplenic crescent of hyperdense fluid. Hyperdense/hemorrhagic fluid extending into the paracolic gutter and into the   pelvis also noted in the right paracolic cutter. Consider spontaneous    perisplenic hemorrhage. No rib fracture or reported history of trauma in this    patient. Continued hemodynamic monitoring is recommended. Narrative:  CLINICAL HISTORY:  Perisplenic hematoma        INDICATION:  Perisplenic hematoma       COMPARISON: None       TECHNIQUE: CT of the abdomen and pelvis with  IV contrast , Isovue-370 is   performed. Axial images from the abdomen to the level of the upper thighs is   obtained. Manual post-processing of the images and coronal reformatting is also   performed. Multiplanar reformatted imaging was performed. Sagittal and coronal reformatting. 3-D Postprocessing of imaging was performed. 3-D MIP reconstructed images were obtained in the coronal plane. CT dose reduction was achieved through use of a standardized protocol tailored   for this examination and automatic exposure control for dose modulation. FINDINGS:    There is a crescent of hyperdense fluid adjacent to the spleen. There is no   evidence of active extravasation. There is no left-sided inferior rib fracture. There is splenomegaly. Hepatic steatosis. High density fluid extends into the   paracolic gutter and into the deep pelvis. There is splenomegaly with the spleen   measuring 14 cm in AP dimension and 14 cm in cranial caudal dimension. The aorta tapers without aneurysm. There is no retroperitoneal adenopathy or   mass. normal appendix. The visualized bowel is normal. There is no free intraperitoneal air. The abdominal aorta is without evidence of aneurysm or dissection. Celiac origin   is within normal limits. SMA is patent. RASHEEDA is patent.  The IVC is normal in   caliber. Portal vein is patent. There are single renal arteries bilaterally. There is no hemodynamically   significant stenosis in the renal arteries. The common iliac arteries are normal.    The external iliac arteries are normal.   The femoral arteries and proximal superficial femoral arteries are normal.   Normal accumulation and excretion of contrast material from the kidneys. There   is an IUD in place. 06/28/19 0519  CTA CHEST W OR W WO CONT Final result    Impression:  IMPRESSION:    There is no proximal pulmonary embolism. There is no aortic aneurysm or dissection. Splenomegaly with perisplenic crescent of hyperdense fluid. Consider spontaneous   perisplenic hemorrhage. No rib fracture or reported history of trauma in this   patient. Hemodynamic monitoring is recommended. CT angiography arterial phase imaging of the abdomen is recommended to evaluate   for possible splenic laceration/splenic injury which is not apparent on this   field-of-view. The findings were called to Dr. Yael Almonte on 6/28/2019 at 5:25 AM. by Dr. Thurston Claude. [id number]       Narrative:  CLINICAL HISTORY: Chest pain, dyspnea       INDICATION: Chest pain and dyspnea       COMPARISON: None       TECHNIQUE: CT of the chest with  IV contrast , Isovue-370 is performed. Axial   images from the thoracic inlet to the level of the upper abdomen are obtained. Manual post-processing of the images and coronal reformatting is also performed. CT dose reduction was achieved through use of a standardized protocol tailored   for this examination and automatic exposure control for dose modulation. Multiplanar reformatted imaging was performed. Sagittal and coronal reformatting. 3-D Postprocessing of imaging was performed. 3-D MIP reconstructed images were obtained in the coronal plane. FINDINGS:    There is no proximal pulmonary embolism. The proximal main pulmonary arteries   are well opacified.  More distal pulmonary arteries are not well evaluated. There is no aortic aneurysm or dissection. Heterogenous appearance of the thyroid gland. Small hiatal hernia. There is splenomegaly. There is perisplenic crescent of hypodensity. Hepatic   steatosis. There is no pleural or pericardial effusion. There is no mediastinal,   axillary or hilar lymphadenopathy. The aorta is normal in course and caliber. The proximal pulmonary arteries are without evidence of filling defects. No   lytic or blastic lesions are identified. The remainder of the upper abdomen   visualized is unremarkable. CXR Results  (Last 48 hours)    None          Medical Decision Making   I am the first provider for this patient. I reviewed the vital signs, available nursing notes, past medical history, past surgical history, family history and social history. Vital Signs-Reviewed the patient's vital signs. Patient Vitals for the past 24 hrs:   Temp Pulse Resp BP SpO2   06/28/19 0830  (!) 117  106/77 100 %   06/28/19 0800  (!) 117  97/82 100 %   06/28/19 0730 99.1 °F (37.3 °C) (!) 116 18 108/63 100 %   06/28/19 0700  (!) 122  113/72 100 %   06/28/19 0630  (!) 118  103/70 100 %   06/28/19 0606    111/72    06/28/19 0530  (!) 123  (!) 85/47 99 %   06/28/19 0430  (!) 125 (!) 34 (!) 86/60 99 %   06/28/19 0345 97.6 °F (36.4 °C) (!) 135 22 102/52 100 %       Pulse Oximetry Analysis - 100% on RA    Cardiac Monitor:   Rate: 135 bpm  Rhythm: Sinus Tachycardia      ED EKG interpretation:  Rhythm: sinus tachycardia; and regular . Rate (approx.): 128; Axis: normal; P wave: normal; QRS interval: normal ; ST/T wave: normal; Other findings: normal. This EKG was interpreted by April Negrete M.D.     Records Reviewed: Nursing Notes, Old Medical Records, Previous electrocardiograms, Previous Radiology Studies and Previous Laboratory Studies    Provider Notes (Medical Decision Making):   DDx includes STEMI, NSTEMI, Angina, PE, Aortic Pathology, Chest Wall Pain, Pleurisy, Pneumonia, GERD/esophagitis, Anxiety. No cough/fever or focal lung findings to suggest pneumonia. No tachycardia, hypoxia or pleuritic component to suggest PE. Pulses symmetric and no extremely elevated BP/asymmetry or classic tearing sensation to suggest Aortic Dissection. Also, no neuro findings. No wretching/forceful vomiting to suggest esophageal disaster. Denies IV drug abuse, has native valves, no fevers/murmurs or skin lesions to suggest endocarditis. Will evaluate with EKG, labs, cardiac enzymes, chest x-ray. Will provide pain control and reassess. ED Course:   Initial assessment performed. The patients presenting problems have been discussed, and they are in agreement with the care plan formulated and outlined with them. I have encouraged them to ask questions as they arise throughout their visit. ALCOHOL/SUBSTANCE ABUSE COUNSELING:  Upon evaluation, pt endorsed recent alcohol/illicit drug use. For approximately 15 minutes, pt has been counseled on the dangers of alcohol and illicit drug use on their health, and they were encouraged to quit as soon as possible in order to decrease further risks to their health. Pt has conveyed their understanding of the risks involved should they continue to use these products. I reviewed our electronic medical record system for any past medical records that were available that may contribute to the patient's current condition, the nursing notes and vital signs from today's visit.   Krystin Sifuentes MD    Medications Administered During ED Course:  Medications   sodium chloride (NS) flush 5-40 mL (10 mL IntraVENous Given 7/1/19 0600)   sodium chloride (NS) flush 5-40 mL (30 mL IntraVENous Given 6/28/19 1046)   naloxone (NARCAN) injection 0.4 mg (has no administration in time range)   ondansetron (ZOFRAN) injection 4 mg (has no administration in time range)   diphenhydrAMINE (BENADRYL) injection 50 mg (has no administration in time range)   oxyCODONE-acetaminophen (PERCOCET) 5-325 mg per tablet 2 Tab (2 Tabs Oral Given 7/1/19 0845)   potassium chloride SR (KLOR-CON 10) tablet 20 mEq (20 mEq Oral Given 7/1/19 0845)   acetaminophen (TYLENOL) tablet 650 mg (650 mg Oral Given 6/29/19 2038)   LORazepam (ATIVAN) injection 1 mg (1 mg IntraVENous Given 6/28/19 0419)   sodium chloride 0.9 % bolus infusion 1,000 mL (0 mL IntraVENous IV Completed 6/28/19 0730)   cyclobenzaprine (FLEXERIL) tablet 10 mg (10 mg Oral Given 6/28/19 0419)   sodium chloride (NS) flush 10 mL (10 mL IntraVENous Given 6/28/19 0519)   iopamidol (ISOVUE-370) 76 % injection 80 mL (80 mL IntraVENous Given 6/28/19 0520)   sodium chloride (NS) flush 10 mL (10 mL IntraVENous Given 6/28/19 0611)   iopamidol (ISOVUE-370) 76 % injection 100 mL (100 mL IntraVENous Given 6/28/19 0611)   sodium chloride 0.9 % bolus infusion 1,000 mL (0 mL IntraVENous IV Completed 6/28/19 0732)   potassium chloride SR (KLOR-CON 10) tablet 40 mEq (40 mEq Oral Given 6/28/19 1045)   potassium chloride 10 mEq in 100 ml IVPB (10 mEq IntraVENous New Bag 6/28/19 1203)   calcium gluconate 2 g in 0.9% sodium chloride 100 mL IVPB (0 g IntraVENous Transfusion Completed 6/30/19 0906)   potassium chloride 10 mEq in 100 ml IVPB (0 mEq IntraVENous IV Completed 6/30/19 0906)   sodium chloride 0.9 % bolus infusion 500 mL (0 mL IntraVENous IV Completed 6/30/19 0906)   potassium chloride SR (KLOR-CON 10) tablet 10 mEq (10 mEq Oral Given 6/29/19 1300)   cosyntropin (CORTROSYN) 0.25 mg in sodium chloride 0.9% TEST injection (0.25 mg IntraVENous Given 6/30/19 1105)     ED Course as of Jun 28 0928 Fri Jun 28, 2019   0542 Warts to me that last week she had a syncopal episode. She states that she fell as she was feeling dizzy. She laid on her stomach but denied any abdominal pain at that time. She said prior to the fall she has been had a GI bug with vomiting. She denies any diarrhea.   She denies any history of sickle cell disease. I discussed with her the CT findings for concern for splenic laceration. She is now hypotensive and tachycardic. Will give a second liter of fluids. Will obtain CTA of the abdomen pelvis. Patient will need to be admitted and will resuscitate here in the ER for possible blood transfusion. [HW]      ED Course User Index  [HW] Todd Ramirez MD     Progress Note:  Pt remains tachycardia, BP trending low, will repeat Hgb and give 2nd liter of IVF bolus    Progress Note:  Splenic laceration noted on CT, will keep NPO, consult Surgery    Progress Note:  Pt now drowsy, requiring 2L of oxygen after IV ativan and pain medications, hold on further narcotics, monitor closely. Consult Note:  Russ Mills MD spoke with Dr. Katherin Rodriguez,   Specialty: Hospitalist  Discussed pt's hx, disposition, and available diagnostic and imaging results. Reviewed care plans. Agree with management and plan thus far. Consultant will evaluate pt for admission. Progress Note:  Pt re-examined, pain improved, will be going to ICU for closer monitoring. Critical Care Time:   CRITICAL CARE NOTE :    IMPENDING DETERIORATION -Airway, Respiratory, Cardiovascular, Metabolic and Renal  ASSOCIATED RISK FACTORS - Hypotension, Shock, Hypoxia, Dysrhythmia, Metabolic changes and Dehydration  MANAGEMENT- Bedside Assessment and Supervision of Care  INTERPRETATION -  Xrays, CT Scan, Blood Gases, ECG, Blood Pressure and Cardiac Output Measures   INTERVENTIONS - hemodynamic mngmt and Metobolic interventions  CASE REVIEW - Medical Sub-Specialist, Nursing, Family and Surgeon  TREATMENT RESPONSE -Improved  PERFORMED BY - Self    NOTES   :    I have spent 75 minutes of critical care time involved in lab review, consultations with specialist, family decision- making, bedside attention and documentation. During this entire length of time I was immediately available to the patient . Critical Care:   The reason for providing this level of medical care for this critically ill patient was due to a critical illness that impaired one or more vital organ systems, such that there was a high probability of imminent or life threatening deterioration in the patient's condition. This care involved high complexity decision making to assess, manipulate, and support vital system functions, to treat this degree of vital organ system failure, and to prevent further life threatening deterioration of the patients condition. Liborio Coffman MD    Disposition:  ADMIT  Patient is being admitted to the hospital.  The results of their tests and reasons for their admission have been discussed with them and/or available family. They convey agreement and understanding for the need to be admitted and for their admission diagnosis. Consultation has been made with the inpatient physician specialist for hospitalization. Diagnosis     Clinical Impression:   1. Laceration of spleen, initial encounter    2. Sinus tachycardia    3. Hypocalcemia    4. Acute hypokalemia    5. Symptomatic anemia    6. Acute chest pain        Attestation:  I personally performed the services described in this documentation on this date 6/28/2019 for patient Chavez Faulkner. Liborio Coffman MD    Please note that this dictation was completed with [employer], the computer voice recognition software. Quite often unanticipated grammatical, syntax, homophones, and other interpretive errors are inadvertently transcribed by the computer software. Please disregard these errors. Please excuse any errors that have escaped final proofreading. This note will not be viewable in [address]

## 2019-06-28 NOTE — PROGRESS NOTES
Pharmacy Clarification of Prior to Admission Medication Regimen     The patient was interviewed regarding clarification of the prior to admission medication regimen and was questioned regarding use of any other inhalers, topical products, over the counter medications, herbal medications, vitamin products or ophthalmic/nasal/otic medication use. Information Obtained From: Patient    Pertinent Pharmacy Findings:  Patient denied being prescribed any medications. Patient stated she was not taking any OTC medications including Tylenol, Advil, allergies medication, or Vitamins.     PTA medication list was corrected to the following:     None          Thank you,  Moisés Jean, James  Medication History Pharmacy Technician

## 2019-06-28 NOTE — PROGRESS NOTES
0900 TRANSFER - IN REPORT:    Verbal report received from ED(name) on King Jennings  being received from ED bed 16(unit) for routine progression of care      Report consisted of patients Situation, Background, Assessment and   Recommendations(SBAR). Information from the following report(s) SBAR, Kardex, ED Summary, Intake/Output, MAR, Recent Results and Cardiac Rhythm ST was reviewed with the receiving nurse. Opportunity for questions and clarification was provided. Assessment completed upon patients arrival to unit and care assumed. 0940 pt received from ED via stretcher, transferred to bed with assist, mod pain associated with movement. Primary Nurse Vicente Dee RN and Garrett Adler RN performed a dual skin assessment on this patient No impairment noted  Xavier score is 19  Reviewed plan of care, oriented pt to room and reportable s/s to call for. 1100 pain controled with po meds  1200 pt reassessed per flowsheet, pain worse with movement and palpation  1400 pt gretchen ice chips well  1600 pt reassessed per flowsheet, pain decreased, able to move better in bed without severe pain  1800 pt's diet able to be advanced to clear liquid diet, gretchen sips of ginger ale thus far  1900 Bedside and Verbal shift change report given to Francine Kenney (oncoming nurse) by Pierre Harris (offgoing nurse).  Report included the following information SBAR, Kardex, Procedure Summary, Intake/Output, MAR, Recent Results and Cardiac Rhythm ST.

## 2019-06-29 LAB
ANION GAP SERPL CALC-SCNC: 6 MMOL/L (ref 5–15)
APPEARANCE UR: CLEAR
BACTERIA URNS QL MICRO: NEGATIVE /HPF
BILIRUB UR QL: NEGATIVE
BUN SERPL-MCNC: 3 MG/DL (ref 6–20)
BUN/CREAT SERPL: 7 (ref 12–20)
CALCIUM SERPL-MCNC: 6 MG/DL (ref 8.5–10.1)
CHLORIDE SERPL-SCNC: 115 MMOL/L (ref 97–108)
CMV IGG SERPL IA-ACNC: 2 U/ML (ref 0–0.59)
CMV IGM SERPL IA-ACNC: >240 AU/ML (ref 0–29.9)
CO2 SERPL-SCNC: 20 MMOL/L (ref 21–32)
COLOR UR: NORMAL
COMMENT, 096657: ABNORMAL
CREAT SERPL-MCNC: 0.41 MG/DL (ref 0.55–1.02)
EBV VCA IGG SER-ACNC: 53.7 U/ML (ref 0–17.9)
EBV VCA IGM SER-ACNC: <36 U/ML (ref 0–35.9)
EPITH CASTS URNS QL MICRO: NORMAL /LPF
ERYTHROCYTE [DISTWIDTH] IN BLOOD BY AUTOMATED COUNT: 14.5 % (ref 11.5–14.5)
GLUCOSE SERPL-MCNC: 83 MG/DL (ref 65–100)
GLUCOSE UR STRIP.AUTO-MCNC: NEGATIVE MG/DL
HCT VFR BLD AUTO: 30 % (ref 35–47)
HGB BLD-MCNC: 9.5 G/DL (ref 11.5–16)
HGB BLD-MCNC: 9.7 G/DL (ref 11.5–16)
HGB UR QL STRIP: NEGATIVE
HYALINE CASTS URNS QL MICRO: NORMAL /LPF (ref 0–5)
KETONES UR QL STRIP.AUTO: NEGATIVE MG/DL
LEUKOCYTE ESTERASE UR QL STRIP.AUTO: NEGATIVE
MCH RBC QN AUTO: 27.2 PG (ref 26–34)
MCHC RBC AUTO-ENTMCNC: 31.7 G/DL (ref 30–36.5)
MCV RBC AUTO: 86 FL (ref 80–99)
NITRITE UR QL STRIP.AUTO: NEGATIVE
NRBC # BLD: 0 K/UL (ref 0–0.01)
NRBC BLD-RTO: 0 PER 100 WBC
PH UR STRIP: 5.5 [PH] (ref 5–8)
PLATELET # BLD AUTO: 127 K/UL (ref 150–400)
PMV BLD AUTO: 12.4 FL (ref 8.9–12.9)
POTASSIUM SERPL-SCNC: 3 MMOL/L (ref 3.5–5.1)
PROT UR STRIP-MCNC: NEGATIVE MG/DL
RBC # BLD AUTO: 3.49 M/UL (ref 3.8–5.2)
RBC #/AREA URNS HPF: NORMAL /HPF (ref 0–5)
SODIUM SERPL-SCNC: 141 MMOL/L (ref 136–145)
SP GR UR REFRACTOMETRY: 1.01 (ref 1–1.03)
T GONDII IGG SERPL IA-ACNC: <3 IU/ML (ref 0–7.1)
T GONDII IGM SER IA-ACNC: 3.9 AU/ML (ref 0–7.9)
UA: UC IF INDICATED,UAUC: NORMAL
UROBILINOGEN UR QL STRIP.AUTO: 0.2 EU/DL (ref 0.2–1)
WBC # BLD AUTO: 5.6 K/UL (ref 3.6–11)
WBC URNS QL MICRO: NORMAL /HPF (ref 0–4)

## 2019-06-29 PROCEDURE — 87086 URINE CULTURE/COLONY COUNT: CPT

## 2019-06-29 PROCEDURE — 36415 COLL VENOUS BLD VENIPUNCTURE: CPT

## 2019-06-29 PROCEDURE — 74011250637 HC RX REV CODE- 250/637: Performed by: INTERNAL MEDICINE

## 2019-06-29 PROCEDURE — 81001 URINALYSIS AUTO W/SCOPE: CPT

## 2019-06-29 PROCEDURE — 74011250636 HC RX REV CODE- 250/636: Performed by: INTERNAL MEDICINE

## 2019-06-29 PROCEDURE — 65660000000 HC RM CCU STEPDOWN

## 2019-06-29 PROCEDURE — 74011250636 HC RX REV CODE- 250/636: Performed by: SURGERY

## 2019-06-29 PROCEDURE — 74011250636 HC RX REV CODE- 250/636: Performed by: EMERGENCY MEDICINE

## 2019-06-29 PROCEDURE — 76937 US GUIDE VASCULAR ACCESS: CPT

## 2019-06-29 PROCEDURE — 87040 BLOOD CULTURE FOR BACTERIA: CPT

## 2019-06-29 PROCEDURE — 85027 COMPLETE CBC AUTOMATED: CPT

## 2019-06-29 PROCEDURE — 77030038269 HC DRN EXT URIN PURWCK BARD -A

## 2019-06-29 PROCEDURE — 80048 BASIC METABOLIC PNL TOTAL CA: CPT

## 2019-06-29 PROCEDURE — 51798 US URINE CAPACITY MEASURE: CPT

## 2019-06-29 PROCEDURE — 85018 HEMOGLOBIN: CPT

## 2019-06-29 PROCEDURE — 74011250637 HC RX REV CODE- 250/637: Performed by: SURGERY

## 2019-06-29 PROCEDURE — 74011000258 HC RX REV CODE- 258: Performed by: SURGERY

## 2019-06-29 RX ORDER — POTASSIUM CHLORIDE 750 MG/1
20 TABLET, FILM COATED, EXTENDED RELEASE ORAL 2 TIMES DAILY
Status: DISCONTINUED | OUTPATIENT
Start: 2019-06-29 | End: 2019-07-02 | Stop reason: HOSPADM

## 2019-06-29 RX ORDER — POTASSIUM CHLORIDE 750 MG/1
10 TABLET, FILM COATED, EXTENDED RELEASE ORAL ONCE
Status: COMPLETED | OUTPATIENT
Start: 2019-06-29 | End: 2019-06-29

## 2019-06-29 RX ORDER — ACETAMINOPHEN 325 MG/1
650 TABLET ORAL
Status: DISCONTINUED | OUTPATIENT
Start: 2019-06-29 | End: 2019-07-02 | Stop reason: HOSPADM

## 2019-06-29 RX ORDER — POTASSIUM CHLORIDE 750 MG/1
10 TABLET, FILM COATED, EXTENDED RELEASE ORAL 2 TIMES DAILY
Status: DISCONTINUED | OUTPATIENT
Start: 2019-06-29 | End: 2019-06-29

## 2019-06-29 RX ORDER — POTASSIUM CHLORIDE 7.45 MG/ML
10 INJECTION INTRAVENOUS
Status: COMPLETED | OUTPATIENT
Start: 2019-06-29 | End: 2019-06-30

## 2019-06-29 RX ADMIN — POTASSIUM CHLORIDE 10 MEQ: 10 INJECTION, SOLUTION INTRAVENOUS at 12:14

## 2019-06-29 RX ADMIN — OXYCODONE AND ACETAMINOPHEN 2 TABLET: 5; 325 TABLET ORAL at 16:44

## 2019-06-29 RX ADMIN — Medication 10 ML: at 21:45

## 2019-06-29 RX ADMIN — POTASSIUM CHLORIDE 10 MEQ: 10 INJECTION, SOLUTION INTRAVENOUS at 10:35

## 2019-06-29 RX ADMIN — OXYCODONE AND ACETAMINOPHEN 2 TABLET: 5; 325 TABLET ORAL at 01:06

## 2019-06-29 RX ADMIN — CALCIUM GLUCONATE 2 G: 98 INJECTION, SOLUTION INTRAVENOUS at 10:00

## 2019-06-29 RX ADMIN — POTASSIUM CHLORIDE 10 MEQ: 10 TABLET, FILM COATED, EXTENDED RELEASE ORAL at 13:00

## 2019-06-29 RX ADMIN — OXYCODONE AND ACETAMINOPHEN 1 TABLET: 5; 325 TABLET ORAL at 08:42

## 2019-06-29 RX ADMIN — Medication 10 ML: at 05:58

## 2019-06-29 RX ADMIN — Medication 1 AMPULE: at 21:45

## 2019-06-29 RX ADMIN — POTASSIUM CHLORIDE 10 MEQ: 750 TABLET, FILM COATED, EXTENDED RELEASE ORAL at 08:43

## 2019-06-29 RX ADMIN — Medication 40 ML: at 16:49

## 2019-06-29 RX ADMIN — OXYCODONE AND ACETAMINOPHEN 2 TABLET: 5; 325 TABLET ORAL at 21:43

## 2019-06-29 RX ADMIN — POTASSIUM CHLORIDE 10 MEQ: 750 TABLET, FILM COATED, EXTENDED RELEASE ORAL at 10:00

## 2019-06-29 RX ADMIN — SODIUM CHLORIDE 125 ML/HR: 900 INJECTION, SOLUTION INTRAVENOUS at 13:00

## 2019-06-29 RX ADMIN — POTASSIUM CHLORIDE 20 MEQ: 750 TABLET, FILM COATED, EXTENDED RELEASE ORAL at 19:43

## 2019-06-29 RX ADMIN — SODIUM CHLORIDE 125 ML/HR: 900 INJECTION, SOLUTION INTRAVENOUS at 06:14

## 2019-06-29 RX ADMIN — ACETAMINOPHEN 650 MG: 325 TABLET ORAL at 20:38

## 2019-06-29 RX ADMIN — POTASSIUM CHLORIDE 10 MEQ: 10 INJECTION, SOLUTION INTRAVENOUS at 09:15

## 2019-06-29 RX ADMIN — Medication 1 AMPULE: at 08:43

## 2019-06-29 RX ADMIN — SODIUM CHLORIDE 500 ML: 900 INJECTION, SOLUTION INTRAVENOUS at 10:36

## 2019-06-29 RX ADMIN — POTASSIUM CHLORIDE 10 MEQ: 10 INJECTION, SOLUTION INTRAVENOUS at 11:10

## 2019-06-29 NOTE — PROGRESS NOTES
SURGERY PROGRESS NOTE      Admit Date: 2019        Subjective:     Patient feels better than on admission. Denies nausea. Objective:     Visit Vitals  /71   Pulse (!) 122   Temp 98.7 °F (37.1 °C)   Resp 30   Ht 5' 7\" (1.702 m)   Wt 124.7 kg (274 lb 14.6 oz)   SpO2 96%   Breastfeeding? No   BMI 43.06 kg/m²        Temp (24hrs), Av.6 °F (37 °C), Min:98.3 °F (36.8 °C), Max:98.8 °F (37.1 °C)       07 -  1900  In: 0603 [P.O.:480; I.V.:1245]  Out: 600 [Urine:600]  1901 -  0700  In: 5359.2 [P.O.:480; I.V.:4879.2]  Out: 1150 [Urine:1150]    Physical Exam:    General:  alert, cooperative, no distress, appears stated age   Abdomen: soft, bowel sounds active, non-tender     Hgb 11 -> 9.5 ans has been 9.5 over the last 12 hours  Assessment:     Active Problems:    Tachycardia (2012)      Overview: due to mild dehydration vs other      Splenic laceration (2019)    splenic laceration -  Hgb trend is consisted with dilutional as IVF started and now stable. Symptoms better so, my concern for ongoing bleeding is low. Will transfer to stepdown and decrease frequency of Hgb checks. Continue bedrest for another 24 hours. Sinus tachycardia - has been persistent. There is an ER note from 2018 that shows her heart rate is in the 130. She has a problem list item of tachycardia from . I suspected her current sinus tachycardia is unrelated to the spleen.   Will consult hospitalist for evaluation

## 2019-06-29 NOTE — CONSULTS
Hospitalist Consultation Note    NAME:  Farzana Epstein   :   1990   MRN:   [id number]     ATTENDING: Misha Gama MD  PCP:  Smith Stark DO    Date/Time:  2019 10:24 AM      Recommendations/Plan:     Inappropriate Sinus tachycardia   -unclear exact etiology at present. Appears to be chronic issue at least since   Per PCP notes HR ~ 100-115 but pt was not aware about it   BP stable, not orthostatic   TSH normal   Hb 9.5 but stable, doubt it will cause that level of tachycardia. ECG: sinus tachycardia    CTA chest: no PE   She is not in pain   -check echo   -BP in 110s, will not tolerate BB/CCB     Splenic laceration   -mononucleosis screen negative   -management per primary team     Hypocalcemia   -continue to closely monitor  -per PCP notes h/o Vit D deficiency, but pt cannot recall being told about it and is not on Vit D supplement  -check Vit D level    Hypokalemia  -supplement agressively, follow Mg     Anemia  -Acute due to above on chronic iron deficiency anemia   -Hb stable now  -check iron level, if low consider iron IV         Code Status: Full code   DVT Prophylaxis: per primary team     Thank you very much for allowing us to participate in this patient care. We will follow this pt with you. Subjective:   REQUESTING PHYSICIAN: Dr Wiliam Gaucher: sinus tachycardia   Cindy Gale is a 34 y.o.  female who I was asked to see for above problem. Pt presented on  with abdominal pain. CT revealed perisplenic hemorrhage. She was admitted under surgical services. It is not clear if spleen laceration was spontaneous or traumatic. She sustained a ground level fall during a vomiting episode last week. Since admission HR remain in 120-130s. Initially tachycardia was attributed to dehydration/pain. Pt is clinically getting better but tachycardia persist.   Per chart review tachycardia appears to be a chronic problem at least since .  From PCP notes HR ~ 100-115. From ER note 2018, HR was 130 at that time. Pt is not aware of this problem. She never was evaluated by cardiology in the past.     Past Medical History:   Diagnosis Date    Allergic rhinitis, cause unspecified     Iron deficiency anemia     during pregnancy    Obesity, Class I, BMI 30-34.9       Past Surgical History:   Procedure Laterality Date    HX  SECTION      for fetal distress (Dr. Trish Myers)     Social History     Tobacco Use    Smoking status: Never Smoker    Smokeless tobacco: Never Used   Substance Use Topics    Alcohol use: Yes     Comment: social      Family history:  No h/o tachycardia       Allergies   Allergen Reactions    Penicillins Rash      Prior to Admission medications    Not on File   none per pt     REVIEW OF SYSTEMS:     Total of 12 systems reviewed as follows:   I am not able to complete the review of systems because:    The patient is intubated and sedated    The patient has altered mental status due to his acute medical problems    The patient has baseline aphasia from prior stroke(s)    The patient has baseline dementia and is not reliable historian                 POSITIVE= underlined text  Negative = text not underlined  General:  fever, chills, sweats, generalized weakness, weight loss/gain,      loss of appetite   Eyes:    blurred vision, eye pain, loss of vision, double vision  ENT:    rhinorrhea, pharyngitis   Respiratory:   cough, sputum production, SOB, wheezing, [place], pleuritic pain   Cardiology:   chest pain, palpitations, orthopnea, PND, edema, syncope   Gastrointestinal:  abdominal pain , N/V, dysphagia, diarrhea, constipation, bleeding   Genitourinary:  frequency, urgency, dysuria, hematuria, incontinence   Muskuloskeletal :  arthralgia, myalgia   Hematology:  easy bruising, nose or gum bleeding, lymphadenopathy   Dermatological: rash, ulceration, pruritis   Endocrine:   hot flashes or polydipsia   Neurological:  headache, dizziness, confusion, focal weakness, paresthesia,     Speech difficulties, memory loss, gait disturbance  Psychological: Feelings of anxiety, depression, agitation    Objective:   VITALS:    Visit Vitals  /78 (BP 1 Location: Left arm, BP Patient Position: Standing)   Pulse (!) 135   Temp 98.7 °F (37.1 °C)   Resp 25   Ht 5' 7\" (1.702 m)   Wt 124.7 kg (274 lb 14.6 oz)   SpO2 99%   Breastfeeding? No   BMI 43.06 kg/m²     Temp (24hrs), Av.6 °F (37 °C), Min:98.3 °F (36.8 °C), Max:98.8 °F (37.1 °C)      PHYSICAL EXAM:   General:    Alert, cooperative, no distress, appears stated age. HEENT: Atraumatic, anicteric sclerae, pink conjunctivae     No oral ulcers, mucosa moist, throat clear  Neck:  Supple, symmetrical,  thyroid: non tender  Lungs:   Clear to auscultation bilaterally. No Wheezing or Rhonchi. No rales. Chest wall:  No tenderness  No Accessory muscle use. Heart:   Regular  Rhythm,tachycardia. No  murmur   No edema  Abdomen:   Soft, non-tender. Not distended. Bowel sounds normal  Extremities: No cyanosis. No clubbing  Skin:     Not pale. Not Jaundiced  No rashes   Psych:  Good insight. Not depressed. Not anxious or agitated. Neurologic: EOMs intact. No facial asymmetry. No aphasia or slurred speech.  Symmetrical strength, Alert and oriented X 4.     _______________________________________________________________________  Care Plan discussed with:    Comments   Patient y    Family      RN y    Care Manager                    Consultant:      ____________________________________________________________________  TOTAL TIME:  55  mins    Comments     Reviewed previous records   >50% of visit spent in counseling and coordination of care  Discussion with patient and/or family and questions answered       Critical Care Provided     Minutes non procedure based  ________________________________________________________________________  Signed: Melissa Cardoza MD      Procedures: see electronic medical records for all procedures/Xrays and details which were not copied into this note but were reviewed prior to creation of Plan.     LAB DATA REVIEWED:    Recent Results (from the past 24 hour(s))   HEMOGLOBIN    Collection Time: 06/28/19  6:20 PM   Result Value Ref Range    HGB 10.0 (L) 11.5 - 16.0 g/dL   HGB & HCT    Collection Time: 06/28/19 10:18 PM   Result Value Ref Range    HGB 9.2 (L) 11.5 - 16.0 g/dL    HCT 28.2 (L) 35.0 - 47.0 %   CBC W/O DIFF    Collection Time: 06/29/19  4:20 AM   Result Value Ref Range    WBC 5.6 3.6 - 11.0 K/uL    RBC 3.49 (L) 3.80 - 5.20 M/uL    HGB 9.5 (L) 11.5 - 16.0 g/dL    HCT 30.0 (L) 35.0 - 47.0 %    MCV 86.0 80.0 - 99.0 FL    MCH 27.2 26.0 - 34.0 PG    MCHC 31.7 30.0 - 36.5 g/dL    RDW 14.5 11.5 - 14.5 %    PLATELET 347 (L) 745 - 400 K/uL    MPV 12.4 8.9 - 12.9 FL    NRBC 0.0 0  WBC    ABSOLUTE NRBC 0.00 0.00 - 9.57 K/uL   METABOLIC PANEL, BASIC    Collection Time: 06/29/19  4:20 AM   Result Value Ref Range    Sodium 141 136 - 145 mmol/L    Potassium 3.0 (L) 3.5 - 5.1 mmol/L    Chloride 115 (H) 97 - 108 mmol/L    CO2 20 (L) 21 - 32 mmol/L    Anion gap 6 5 - 15 mmol/L    Glucose 83 65 - 100 mg/dL    BUN 3 (L) 6 - 20 MG/DL    Creatinine 0.41 (L) 0.55 - 1.02 MG/DL    BUN/Creatinine ratio 7 (L) 12 - 20      GFR est AA >60 >60 ml/min/1.73m2    GFR est non-AA >60 >60 ml/min/1.73m2    Calcium 6.0 (LL) 8.5 - 10.1 MG/DL       _____________________________  Hospitalist: Anai Barraza MD

## 2019-06-29 NOTE — PROGRESS NOTES
6/29/2019    INTENSIVIST PROGRESS NOTE:     6/29  Doing well  No distress  Transfer out today per Surgery  HgB 9.5    Patient seen and evaluated, chart reviewed   35 yo female presented with left flank pain after a fall a few days ago  Found to have a splenic laceration  Admitted to ccu for observation  Now pt in CCU in no distress  No acute complaints    ROS: no sob, no cp, left flank pain    Visit Vitals  /78 (BP 1 Location: Left arm, BP Patient Position: Standing)   Pulse (!) 135   Temp 98.7 °F (37.1 °C)   Resp 25   Ht 5' 7\" (1.702 m)   Wt 124.7 kg (274 lb 14.6 oz)   SpO2 99%   Breastfeeding?  No   BMI 43.06 kg/m²       General: no distress  Eyes: anicteric  HEENT: dry oral mucosa  Neck: FROM  CV: RRR  Lungs: clear  Abd: soft  : no hematuria  Ext: no edema  Skin: no rash  Musculoskeletal: normal inspection  Neuro: non focal    CXR: clear    Chest ct: no asdz    Labs reviewed    A/P:  - splenic laceration; no surgical intervention, serial h/h  - pain control  - clear liquid diet  - transfer out today   - Will assist on disposition planning when stable for transfer  Arlyn Marc MD

## 2019-06-29 NOTE — PROGRESS NOTES
200- received report from [place], [address] [place] [address].  2000- Shift assessment performed. Patient is alert and oriented. No complaints of pain at this time. 2038- Medicated with acetaminophen for elevated temp. 2143- Patient verbalized discomfort, medicated with Percocet as ordered. 0000- Reassessment performed. See flow sheet. 0400- Reassessment performed. See flow sheet. PRN pain medication given. Assisted to BR. Voided without difficulty. 26-  Report given to Keeley arias RN.

## 2019-06-29 NOTE — CONSULTS
Subjective:                [address], [address]  [phone]    Date of  Admission: 2019  3:42 AM     Admission type:Emergency    Guanako Alcaraz is a 34 y.o. female admitted for Splenic laceration [S36.039A]. She presented with emesis and a fall resulting in a splenic laceration. She was admitted for observation in the ccu with serial hgbs. We are asked to see her for her inappropriate sinus tach. She denies cp/sob      Patient Active Problem List    Diagnosis Date Noted    Splenic laceration 2019    Low back strain 2014    Insomnia 2012    Obesity, Class I, BMI 30-34.9 2012    Vitamin D deficiency 2012    Tachycardia 2012      Errol Sky DO  Past Medical History:   Diagnosis Date    Allergic rhinitis, cause unspecified     Iron deficiency anemia     during pregnancy    Obesity, Class I, BMI 30-34.9       Past Surgical History:   Procedure Laterality Date    HX  SECTION      for fetal distress (Dr. Alma Monique)     Allergies   Allergen Reactions    Penicillins Rash     Social History     Tobacco Use    Smoking status: Never Smoker    Smokeless tobacco: Never Used   Substance Use Topics    Alcohol use: Yes     Comment: social    Drug use: No     No family history on file.    Current Facility-Administered Medications   Medication Dose Route Frequency    potassium chloride SR (KLOR-CON 10) tablet 20 mEq  20 mEq Oral BID    sodium chloride (NS) flush 5-40 mL  5-40 mL IntraVENous Q8H    sodium chloride (NS) flush 5-40 mL  5-40 mL IntraVENous PRN    naloxone (NARCAN) injection 0.4 mg  0.4 mg IntraVENous PRN    ondansetron (ZOFRAN) injection 4 mg  4 mg IntraVENous Q4H PRN    diphenhydrAMINE (BENADRYL) injection 50 mg  50 mg IntraVENous Q6H PRN    0.9% sodium chloride infusion  125 mL/hr IntraVENous CONTINUOUS    oxyCODONE-acetaminophen (PERCOCET) 5-325 mg per tablet 2 Tab  2 Tab Oral Q4H PRN    alcohol 62% (NOZIN) nasal  1 Ampule  1 Ampule Topical Q12H         Review of Symptoms:  Constitutional: negative  Eyes: negative  Ears, nose, mouth, throat, and face: negative  Respiratory: negative  Cardiovascular: negative  Gastrointestinal: emesis, abd pain  Genitourinary: negative  Musculoskeletal: negative  Neurological: negative  Behvioral/Psych: negative  Endocrine: negative     Subjective:      Visit Vitals  /68   Pulse (!) 123   Temp 98.7 °F (37.1 °C)   Resp (!) 32   Ht 5' 7\" (1.702 m)   Wt 274 lb 14.6 oz (124.7 kg)   SpO2 97%   Breastfeeding? No   BMI 43.06 kg/m²       Physical Exam  Abdomen: soft, non-tender. Extremities: extremities normal  Heart: regular rhythm, tachy  Lungs: clear to auscultation bilaterally  Pulses: 2+ and symmetric    Cardiographics    Telemetry: s tach    ECG: s tach    Echocardiogram: pending    Labs:  Recent Labs     06/29/19  1211 06/29/19  0420 06/28/19  2218  06/28/19  0543 06/28/19  0408   WBC  --  5.6  --   --   --  10.2   HGB 9.7* 9.5* 9.2*   < > 10.7* 11.3*   HCT  --  30.0* 28.2*  --  32.5* 34.1*   PLT  --  127*  --   --   --  134*    < > = values in this interval not displayed. Recent Labs     06/29/19  0420 06/28/19  0408    134*   K 3.0* 3.2*   * 102   CO2 20* 24   GLU 83 149*   BUN 3* 7   CREA 0.41* 1.00   CA 6.0* 8.1*   ALB  --  3.0*   TBILI  --  0.5   SGOT  --  92*   ALT  --  207*       Recent Labs     06/28/19  0408   TROIQ <0.05         Intake/Output Summary (Last 24 hours) at 6/29/2019 1457  Last data filed at 6/29/2019 1100  Gross per 24 hour   Intake 4405 ml   Output 1750 ml   Net 2655 ml         Assessment:     Assessment:       Active Problems:    Tachycardia (7/23/2012)      Overview: due to mild dehydration vs other      Splenic laceration (6/28/2019)         Plan:     Genora Markn is a pleasant young lady with inappropriate s tach. Her hgb is down from baseline but should not warrant her s tach in the 130s. Her tsh is wnl. Would advise checking for adrenal insufficiency - consider endocrine consult. If that is negative, can consider a negative chronotropic agent. Will obtain echo to assess lvef. Will follow along.  Thank you for this consultation      Connor Campbell MD, [facility]    6/29/2019

## 2019-06-30 ENCOUNTER — APPOINTMENT (OUTPATIENT)
Dept: NON INVASIVE DIAGNOSTICS | Age: 29
DRG: 815 | End: 2019-06-30
Attending: HOSPITALIST
Payer: COMMERCIAL

## 2019-06-30 LAB
ANION GAP SERPL CALC-SCNC: 5 MMOL/L (ref 5–15)
BUN SERPL-MCNC: 4 MG/DL (ref 6–20)
BUN/CREAT SERPL: 6 (ref 12–20)
CALCIUM SERPL-MCNC: 7.4 MG/DL (ref 8.5–10.1)
CHLORIDE SERPL-SCNC: 110 MMOL/L (ref 97–108)
CO2 SERPL-SCNC: 21 MMOL/L (ref 21–32)
CORTIS 1H P CHAL SERPL-MCNC: 24.2 UG/DL
CORTIS 30M P CHAL SERPL-MCNC: 19.5 UG/DL
CORTIS BS SERPL-MCNC: 11.1 UG/DL
CREAT SERPL-MCNC: 0.69 MG/DL (ref 0.55–1.02)
ERYTHROCYTE [DISTWIDTH] IN BLOOD BY AUTOMATED COUNT: 14.5 % (ref 11.5–14.5)
GLUCOSE SERPL-MCNC: 95 MG/DL (ref 65–100)
HCT VFR BLD AUTO: 31.1 % (ref 35–47)
HGB BLD-MCNC: 9.9 G/DL (ref 11.5–16)
IRON SATN MFR SERPL: 14 % (ref 20–50)
IRON SERPL-MCNC: 30 UG/DL (ref 35–150)
MCH RBC QN AUTO: 27 PG (ref 26–34)
MCHC RBC AUTO-ENTMCNC: 31.8 G/DL (ref 30–36.5)
MCV RBC AUTO: 85 FL (ref 80–99)
NRBC # BLD: 0 K/UL (ref 0–0.01)
NRBC BLD-RTO: 0 PER 100 WBC
PLATELET # BLD AUTO: 126 K/UL (ref 150–400)
PMV BLD AUTO: 12.5 FL (ref 8.9–12.9)
POTASSIUM SERPL-SCNC: 3.8 MMOL/L (ref 3.5–5.1)
RBC # BLD AUTO: 3.66 M/UL (ref 3.8–5.2)
SODIUM SERPL-SCNC: 136 MMOL/L (ref 136–145)
TIBC SERPL-MCNC: 219 UG/DL (ref 250–450)
WBC # BLD AUTO: 6 K/UL (ref 3.6–11)

## 2019-06-30 PROCEDURE — 74011000250 HC RX REV CODE- 250: Performed by: INTERNAL MEDICINE

## 2019-06-30 PROCEDURE — 74011250637 HC RX REV CODE- 250/637: Performed by: INTERNAL MEDICINE

## 2019-06-30 PROCEDURE — 93306 TTE W/DOPPLER COMPLETE: CPT

## 2019-06-30 PROCEDURE — 65660000000 HC RM CCU STEPDOWN

## 2019-06-30 PROCEDURE — 36415 COLL VENOUS BLD VENIPUNCTURE: CPT

## 2019-06-30 PROCEDURE — 82533 TOTAL CORTISOL: CPT

## 2019-06-30 PROCEDURE — 74011250636 HC RX REV CODE- 250/636: Performed by: INTERNAL MEDICINE

## 2019-06-30 PROCEDURE — 85027 COMPLETE CBC AUTOMATED: CPT

## 2019-06-30 PROCEDURE — 74011250637 HC RX REV CODE- 250/637: Performed by: SURGERY

## 2019-06-30 PROCEDURE — 74011250636 HC RX REV CODE- 250/636: Performed by: EMERGENCY MEDICINE

## 2019-06-30 PROCEDURE — 83540 ASSAY OF IRON: CPT

## 2019-06-30 PROCEDURE — 82306 VITAMIN D 25 HYDROXY: CPT

## 2019-06-30 PROCEDURE — 99232 SBSQ HOSP IP/OBS MODERATE 35: CPT | Performed by: SURGERY

## 2019-06-30 PROCEDURE — 80048 BASIC METABOLIC PNL TOTAL CA: CPT

## 2019-06-30 RX ADMIN — POTASSIUM CHLORIDE 20 MEQ: 750 TABLET, FILM COATED, EXTENDED RELEASE ORAL at 18:13

## 2019-06-30 RX ADMIN — POTASSIUM CHLORIDE 20 MEQ: 750 TABLET, FILM COATED, EXTENDED RELEASE ORAL at 09:01

## 2019-06-30 RX ADMIN — SODIUM CHLORIDE 125 ML/HR: 900 INJECTION, SOLUTION INTRAVENOUS at 00:29

## 2019-06-30 RX ADMIN — OXYCODONE AND ACETAMINOPHEN 2 TABLET: 5; 325 TABLET ORAL at 03:50

## 2019-06-30 RX ADMIN — Medication 10 ML: at 18:36

## 2019-06-30 RX ADMIN — Medication 10 ML: at 05:50

## 2019-06-30 RX ADMIN — Medication 1 AMPULE: at 11:10

## 2019-06-30 RX ADMIN — Medication 10 ML: at 21:12

## 2019-06-30 RX ADMIN — COSYNTROPIN 0.25 MG: 0.25 INJECTION, POWDER, LYOPHILIZED, FOR SOLUTION INTRAMUSCULAR; INTRAVENOUS at 11:05

## 2019-06-30 RX ADMIN — OXYCODONE AND ACETAMINOPHEN 2 TABLET: 5; 325 TABLET ORAL at 09:01

## 2019-06-30 RX ADMIN — OXYCODONE AND ACETAMINOPHEN 2 TABLET: 5; 325 TABLET ORAL at 18:13

## 2019-06-30 NOTE — PROGRESS NOTES
SURGERY PROGRESS NOTE      Admit Date: 2019        Subjective:     Patient feels good today, no pain.  + BM. Alirio full liquids and wants to advance diet. Objective:     Visit Vitals  /64 (BP 1 Location: Right arm, BP Patient Position: At rest)   Pulse (!) 110   Temp 99.2 °F (37.3 °C)   Resp 24   Ht 5' 7\" (1.702 m)   Wt 274 lb (124.3 kg)   SpO2 92%   Breastfeeding? No   BMI 42.91 kg/m²        Temp (24hrs), Av.3 °F (37.9 °C), Min:98.4 °F (36.9 °C), Max:102.9 °F (39.4 °C)       07 -  1900  In: 375 [I.V.:375]  Out: -    190 -  0700  In: 5059 [P.O.:2640; I.V.:4295]  Out: 4100 [Urine:4100]    Physical Exam:    General:  alert, cooperative, no distress, appears stated age   Abdomen: soft, bowel sounds active, non-tender     Hgb 11 -> 9.5 ans has been 9.5 over the last 12 hours  Assessment:     Active Problems:    Tachycardia (2012)      Overview: due to mild dehydration vs other      Splenic laceration (2019)    splenic laceration -  Hb stable, increasing. Okay to ambulate on telemetry now. Sinus tachycardia - cardiac w/u underway. Okay to transfer to surg tele bed today.

## 2019-06-30 NOTE — PROGRESS NOTES
Cardiology Progress Note            [address] [address]  [phone]    6/30/2019 9:25 AM    Admit Date: 6/28/2019    Admit Diagnosis: Splenic laceration [S36.039A]    Subjective:     Renetta Duggan is doing well - no cp/sob    Visit Vitals  /64 (BP 1 Location: Right arm, BP Patient Position: At rest)   Pulse (!) 110   Temp 99.2 °F (37.3 °C)   Resp 24   Ht 5' 7\" (1.702 m)   Wt 274 lb (124.3 kg)   SpO2 92%   Breastfeeding? No   BMI 42.91 kg/m²     Current Facility-Administered Medications   Medication Dose Route Frequency    cosyntropin (CORTROSYN) 0.25 mg in sodium chloride 0.9% TEST injection  0.25 mg IntraVENous ONCE    potassium chloride SR (KLOR-CON 10) tablet 20 mEq  20 mEq Oral BID    acetaminophen (TYLENOL) tablet 650 mg  650 mg Oral Q4H PRN    sodium chloride (NS) flush 5-40 mL  5-40 mL IntraVENous Q8H    sodium chloride (NS) flush 5-40 mL  5-40 mL IntraVENous PRN    naloxone (NARCAN) injection 0.4 mg  0.4 mg IntraVENous PRN    ondansetron (ZOFRAN) injection 4 mg  4 mg IntraVENous Q4H PRN    diphenhydrAMINE (BENADRYL) injection 50 mg  50 mg IntraVENous Q6H PRN    0.9% sodium chloride infusion  125 mL/hr IntraVENous CONTINUOUS    oxyCODONE-acetaminophen (PERCOCET) 5-325 mg per tablet 2 Tab  2 Tab Oral Q4H PRN    alcohol 62% (NOZIN) nasal  1 Ampule  1 Ampule Topical Q12H         Objective:      Visit Vitals  /64 (BP 1 Location: Right arm, BP Patient Position: At rest)   Pulse (!) 110   Temp 99.2 °F (37.3 °C)   Resp 24   Ht 5' 7\" (1.702 m)   Wt 274 lb (124.3 kg)   SpO2 92%   Breastfeeding?  No   BMI 42.91 kg/m²       Physical Exam:  Abdomen: soft, non-tender  Extremities: extremities normal  Heart: regular rate and rhythm  Lungs: clear to auscultation bilaterally  Pulses: 2+ and symmetric    Data Review:   Labs:    Recent Labs     06/30/19 0227 06/29/19  1211 06/29/19  0420 06/28/19  2218  06/28/19  0408   WBC 6.0  --  5.6  --   --  10.2   HGB 9. 9* 9.7* 9.5* 9.2*   < > 11.3*   HCT 31.1*  --  30.0* 28.2*   < > 34.1*   *  --  127*  --   --  134*    < > = values in this interval not displayed. Recent Labs     06/30/19  0227 06/29/19  0420 06/28/19  0408    141 134*   K 3.8 3.0* 3.2*   * 115* 102   CO2 21 20* 24   GLU 95 83 149*   BUN 4* 3* 7   CREA 0.69 0.41* 1.00   CA 7.4* 6.0* 8.1*   ALB  --   --  3.0*   TBILI  --   --  0.5   SGOT  --   --  92*   ALT  --   --  207*       Recent Labs     06/28/19  0408   TROIQ <0.05         Intake/Output Summary (Last 24 hours) at 6/30/2019 0925  Last data filed at 6/30/2019 0900  Gross per 24 hour   Intake 5105 ml   Output 2400 ml   Net 2705 ml        Telemetry: ss tach 110    Assessment:     Active Problems:    Tachycardia (7/23/2012)      Overview: due to mild dehydration vs other      Splenic laceration (6/28/2019)        Plan:     Gallo Mari 's tachycardia is better than yesterday. H/h stable. Appreciate endo input - will do acth stim test - if negative, will consider corlanor.      Javier Farfan MD, [facility]    6/30/2019

## 2019-06-30 NOTE — PROGRESS NOTES
Primary Nurse Rc Waters and Zach Madsen RN performed a dual skin assessment on this patient Impairment noted- see wound doc flow sheet  Xavier score is 21  Patient has open puss filled area at the left inner thigh with no drainage and a pink small scar at right gluteal clef

## 2019-06-30 NOTE — PROGRESS NOTES
Endocrine Chart Review    Patient noted for a fall resulting in splenic laceration, also evaluated for tachycardia evaluated by cardiology. Endocrine consulted to exclude possibility of adrenal insufficiency. Patient notably with normal blood pressures, isolated tachycardia present. Sodium borderline at 136 however this is following fluid administration and was normal on admission at 141. Potassium low on admission but is corrected. Plan:   ACTH stimulation test, ordered,  I will follow up on the results,  Will plan to treat as appropriate,    Mario Tavares.  [address] Endocrinology  [address] [place] [address]

## 2019-06-30 NOTE — PROGRESS NOTES
Hospitalist Consult Service Progress Note    NAME: Dotty Thayer   :  1990   MRN:  [id number]       Assessment / Plan:  Inappropriate Sinus tachycardia POA- chronic   -unclear exact etiology at present. Appears to be chronic issue at least since   Per PCP notes HR ~ 100-115 but pt was not aware about it   BP stable, not orthostatic   TSH normal   Hb 9.5 but stable, doubt it will cause that level of tachycardia. ECG: sinus tachycardia    CTA chest: no PE   She is not in pain     IP Cardiology consulted- awaiting ACTH stim test results per Endocrine- if neg plan to start Corlanor. Check 2Decho when available     Splenic laceration POA  -mononucleosis screen negative   -management per primary team - gen surgery  No plans of intervention as pt not actively bleeding per imaging  Plan to advanced diet noted  Plan of Transfer to Surgical tele noted     Hypokalemia POA-resolved, 3.8 today AM  -supplement agressively, follow Mg     Hypocalcemia POA- improved   -continue to closely monitor  -per PCP notes h/o Vit D deficiency, but pt cannot recall being told about it and is not on Vit D supplement  -check Vit D level     Anemia  -Acute due to above on chronic iron deficiency anemia   -Hb stable now- 9.9 today  -check iron level, if low consider iron IV            Code Status: Full code   DVT Prophylaxis: per primary team       40 or above Morbid obesity / Body mass index is 42.91 kg/m². Code status: Full  Prophylaxis: SCD's  Recommended Disposition: Home w/Family     Subjective:     Chief Complaint / Reason for Physician Visit :F/U Splenic laceration, Sinus tachycardia (Chronic)  \"I am fine\". Discussed with RN events overnight.      Review of Systems:  Symptom Y/N Comments  Symptom Y/N Comments   Fever/Chills n   Chest Pain n    Poor Appetite n   Edema n    Cough n   Abdominal Pain n    Sputum n   Joint Pain n    SOB/MENDIOLA n   Pruritis/Rash     Nausea/vomit    Tolerating PT/OT y    Diarrhea Tolerating Diet y Liquid diet   Constipation    Other       Could NOT obtain due to:      Objective:     VITALS:   Last 24hrs VS reviewed since prior progress note. Most recent are:  Patient Vitals for the past 24 hrs:   Temp Pulse Resp BP SpO2   06/30/19 0900 99.2 °F (37.3 °C) (!) 110 24 112/64 92 %   06/30/19 0817    111/62    06/30/19 0800  (!) 104 19 108/65 93 %   06/30/19 0700  (!) 109 20 111/62 95 %   06/30/19 0600  (!) 110 21 111/60 92 %   06/30/19 0500  (!) 108 21 113/66 92 %   06/30/19 0400 98.4 °F (36.9 °C) (!) 118 22 108/73 95 %   06/30/19 0300  (!) 109 21 102/56 95 %   06/30/19 0100  (!) 112 22 103/48 92 %   06/30/19 0002 98.9 °F (37.2 °C) (!) 116 25 105/41 93 %   06/29/19 2301  (!) 138 18 125/64    06/29/19 2200  (!) 130 (!) 32 115/58 94 %   06/29/19 2143 100.3 °F (37.9 °C)       06/29/19 2100  (!) 127 (!) 33 112/70 92 %   06/29/19 2038 (!) 100.9 °F (38.3 °C)       06/29/19 2000 99.9 °F (37.7 °C) (!) 130 (!) 32 112/65 93 %   06/29/19 1943  (!) 127 (!) 33 115/60 91 %   06/29/19 1756 (!) 101.5 °F (38.6 °C) (!) 141 (!) 34 131/68 94 %   06/29/19 1700 (!) 101.9 °F (38.8 °C)       06/29/19 1600 (!) 102.9 °F (39.4 °C) (!) 135 (!) 35 117/67 98 %   06/29/19 1518  (!) 136 26 127/74 97 %   06/29/19 1400  (!) 136 26 130/51 97 %   06/29/19 1338  (!) 131 27 118/73    06/29/19 1200 99.1 °F (37.3 °C) (!) 123 (!) 32 116/68 97 %       Intake/Output Summary (Last 24 hours) at 6/30/2019 1129  Last data filed at 6/30/2019 0900  Gross per 24 hour   Intake 3730 ml   Output 2400 ml   Net 1330 ml        PHYSICAL EXAM:  General: WD, WN. Alert, cooperative, no acute distress    EENT:  EOMI. Anicteric sclerae. MMM  Resp:  CTA bilaterally, no wheezing or rales. No accessory muscle use  CV:  Regular  rhythm,  No edema  GI:  Soft, Non distended, Non tender.  +Bowel sounds  Neurologic:  Alert and oriented X 3, normal speech,   Psych:   Good insight. Not anxious nor agitated  Skin:  No rashes.   No jaundice    Reviewed most current lab test results and cultures  YES  Reviewed most current radiology test results   YES  Review and summation of old records today    NO  Reviewed patient's current orders and MAR    YES  PMH/SH reviewed - no change compared to H&P  ________________________________________________________________________  Care Plan discussed with:    Comments   Patient x    Family      RN x    Care Manager     Consultant  x Dr Evelina Kelley (surgery)                     Multidiciplinary team rounds were held today with , nursing, pharmacist and clinical coordinator. Patient's plan of care was discussed; medications were reviewed and discharge planning was addressed. ________________________________________________________________________  Total NON critical care TIME:  36   Minutes    Total CRITICAL CARE TIME Spent:   Minutes non procedure based      Comments   >50% of visit spent in counseling and coordination of care     ________________________________________________________________________  Gaviota Howard MD     Procedures: see electronic medical records for all procedures/Xrays and details which were not copied into this note but were reviewed prior to creation of Plan. LABS:  I reviewed today's most current labs and imaging studies. Pertinent labs include:  Recent Labs     06/30/19  0227 06/29/19  1211 06/29/19  0420 06/28/19  2218  06/28/19  0408   WBC 6.0  --  5.6  --   --  10.2   HGB 9.9* 9.7* 9.5* 9.2*   < > 11.3*   HCT 31.1*  --  30.0* 28.2*   < > 34.1*   *  --  127*  --   --  134*    < > = values in this interval not displayed.      Recent Labs     06/30/19  0227 06/29/19  0420 06/28/19  0408    141 134*   K 3.8 3.0* 3.2*   * 115* 102   CO2 21 20* 24   GLU 95 83 149*   BUN 4* 3* 7   CREA 0.69 0.41* 1.00   CA 7.4* 6.0* 8.1*   ALB  --   --  3.0*   TBILI  --   --  0.5   SGOT  --   --  92*   ALT  --   --  207*       Signed: Gaviota Howard MD

## 2019-06-30 NOTE — PROGRESS NOTES
Bedside shift change report given to Keaton Sheriff (oncoming nurse) by Susan Santillan RN (offgoing nurse). Report included the following information SBAR.     0900: AM assessment complete. 0915: Pt bathed herself with soap and water and CHG wipes. 1105: Cortrosyn given for ACTH stimulation test.     1205: TRANSFER - OUT REPORT:    Verbal report given to [address], RN(name) on Mesfin Jones  being transferred to [facility](unit) for routine progression of care       Report consisted of patients Situation, Background, Assessment and   Recommendations(SBAR). Information from the following report(s) SBAR was reviewed with the receiving nurse. Lines:   Peripheral IV 06/29/19 Left;Upper Arm (Active)   Site Assessment Clean, dry, & intact 6/30/2019  9:00 AM   Phlebitis Assessment 0 6/30/2019  9:00 AM   Infiltration Assessment 0 6/30/2019  9:00 AM   Dressing Status Clean, dry, & intact 6/30/2019  9:00 AM   Dressing Type Transparent;Tape 6/30/2019  9:00 AM   Hub Color/Line Status Pink;Flushed;Capped 6/30/2019  9:00 AM   Action Taken Other (comment) 6/29/2019  5:55 PM       Peripheral IV 06/29/19 Right Forearm (Active)   Site Assessment Clean, dry, & intact 6/30/2019  9:00 AM   Phlebitis Assessment 0 6/30/2019  9:00 AM   Infiltration Assessment 0 6/30/2019  9:00 AM   Dressing Status Clean, dry, & intact 6/30/2019  9:00 AM   Dressing Type Transparent;Tape 6/30/2019  9:00 AM   Hub Color/Line Status Pink; Infusing 6/30/2019  9:00 AM   Action Taken Other (comment) 6/29/2019  5:55 PM        Opportunity for questions and clarification was provided.       Patient transported with:   Monitor  Registered Nurse

## 2019-07-01 LAB
BASOPHILS # BLD: 0 K/UL (ref 0–0.1)
BASOPHILS NFR BLD: 0 % (ref 0–1)
DIFFERENTIAL METHOD BLD: ABNORMAL
ECHO AV AREA PEAK VELOCITY: 2 CM2
ECHO AV AREA/BSA PEAK VELOCITY: 0.8 CM2/M2
ECHO AV PEAK GRADIENT: 7.5 MMHG
ECHO AV PEAK VELOCITY: 136.82 CM/S
ECHO EST RA PRESSURE: 10 MMHG
ECHO LA AREA 4C: 13 CM2
ECHO LA MAJOR AXIS: 3.45 CM
ECHO LA VOL 4C: 25.04 ML (ref 22–52)
ECHO LA VOLUME INDEX A4C: 10.83 ML/M2 (ref 16–28)
ECHO LV INTERNAL DIMENSION DIASTOLIC: 4.46 CM (ref 3.9–5.3)
ECHO LV INTERNAL DIMENSION SYSTOLIC: 3.28 CM
ECHO LV IVSD: 0.94 CM (ref 0.6–0.9)
ECHO LV MASS 2D: 151 G (ref 67–162)
ECHO LV MASS INDEX 2D: 65.3 G/M2 (ref 43–95)
ECHO LV POSTERIOR WALL DIASTOLIC: 0.88 CM (ref 0.6–0.9)
ECHO LVOT DIAM: 1.99 CM
ECHO LVOT PEAK GRADIENT: 3 MMHG
ECHO LVOT PEAK VELOCITY: 86.35 CM/S
ECHO MV REGURGITANT PEAK GRADIENT: 5.1 MMHG
ECHO MV REGURGITANT PEAK VELOCITY: 113.17 CM/S
ECHO PULMONARY ARTERY SYSTOLIC PRESSURE (PASP): 15 MMHG
ECHO PV MAX VELOCITY: 103.6 CM/S
ECHO PV PEAK GRADIENT: 4.3 MMHG
ECHO RA AREA 4C: 10.24 CM2
ECHO RIGHT VENTRICULAR SYSTOLIC PRESSURE (RVSP): 15 MMHG
ECHO TV REGURGITANT MAX VELOCITY: 112.17 CM/S
ECHO TV REGURGITANT PEAK GRADIENT: 5 MMHG
EOSINOPHIL # BLD: 0 K/UL (ref 0–0.4)
EOSINOPHIL NFR BLD: 0 % (ref 0–7)
ERYTHROCYTE [DISTWIDTH] IN BLOOD BY AUTOMATED COUNT: 14.6 % (ref 11.5–14.5)
HCT VFR BLD AUTO: 30.7 % (ref 35–47)
HGB BLD-MCNC: 10 G/DL (ref 11.5–16)
IMM GRANULOCYTES # BLD AUTO: 0 K/UL (ref 0–0.04)
IMM GRANULOCYTES NFR BLD AUTO: 0 % (ref 0–0.5)
LYMPHOCYTES # BLD: 3.2 K/UL (ref 0.8–3.5)
LYMPHOCYTES NFR BLD: 41 % (ref 12–49)
MCH RBC QN AUTO: 27.3 PG (ref 26–34)
MCHC RBC AUTO-ENTMCNC: 32.6 G/DL (ref 30–36.5)
MCV RBC AUTO: 83.9 FL (ref 80–99)
MONOCYTES # BLD: 0.7 K/UL (ref 0–1)
MONOCYTES NFR BLD: 9 % (ref 5–13)
NEUTS BAND NFR BLD MANUAL: 2 %
NEUTS SEG # BLD: 4 K/UL (ref 1.8–8)
NEUTS SEG NFR BLD: 48 % (ref 32–75)
NRBC # BLD: 0 K/UL (ref 0–0.01)
NRBC BLD-RTO: 0 PER 100 WBC
PLATELET # BLD AUTO: 145 K/UL (ref 150–400)
PMV BLD AUTO: 12 FL (ref 8.9–12.9)
RBC # BLD AUTO: 3.66 M/UL (ref 3.8–5.2)
RBC MORPH BLD: ABNORMAL
WBC # BLD AUTO: 7.9 K/UL (ref 3.6–11)
WBC MORPH BLD: ABNORMAL

## 2019-07-01 PROCEDURE — 85025 COMPLETE CBC W/AUTO DIFF WBC: CPT

## 2019-07-01 PROCEDURE — 36415 COLL VENOUS BLD VENIPUNCTURE: CPT

## 2019-07-01 PROCEDURE — 74011250637 HC RX REV CODE- 250/637: Performed by: SURGERY

## 2019-07-01 PROCEDURE — 99232 SBSQ HOSP IP/OBS MODERATE 35: CPT | Performed by: SURGERY

## 2019-07-01 PROCEDURE — 65660000000 HC RM CCU STEPDOWN

## 2019-07-01 PROCEDURE — 74011250637 HC RX REV CODE- 250/637: Performed by: INTERNAL MEDICINE

## 2019-07-01 RX ADMIN — POTASSIUM CHLORIDE 20 MEQ: 750 TABLET, FILM COATED, EXTENDED RELEASE ORAL at 17:14

## 2019-07-01 RX ADMIN — OXYCODONE AND ACETAMINOPHEN 2 TABLET: 5; 325 TABLET ORAL at 01:51

## 2019-07-01 RX ADMIN — Medication 10 ML: at 13:36

## 2019-07-01 RX ADMIN — POTASSIUM CHLORIDE 20 MEQ: 750 TABLET, FILM COATED, EXTENDED RELEASE ORAL at 08:45

## 2019-07-01 RX ADMIN — OXYCODONE AND ACETAMINOPHEN 2 TABLET: 5; 325 TABLET ORAL at 17:48

## 2019-07-01 RX ADMIN — IVABRADINE 2.5 MG: 5 TABLET, FILM COATED ORAL at 17:14

## 2019-07-01 RX ADMIN — IVABRADINE 2.5 MG: 5 TABLET, FILM COATED ORAL at 09:57

## 2019-07-01 RX ADMIN — Medication 10 ML: at 22:17

## 2019-07-01 RX ADMIN — Medication 10 ML: at 06:00

## 2019-07-01 RX ADMIN — OXYCODONE AND ACETAMINOPHEN 2 TABLET: 5; 325 TABLET ORAL at 08:45

## 2019-07-01 NOTE — PROGRESS NOTES
1700 Patient's heart rate was holding in 120-130. Patient assymtomatic and vital signs stable expect for heart rate. Cardiology paged and I spoke with ADAM Duvall. ADAM Yan stated that cardiology wanted to wait for the morning cortisol and 120's-130's is fine, but if heart rate holds 150's or greater then we need to call their office. NICU admission  No CM consults  Baby boy Cinthia Szymanski was born via c/s on 4/23 at [address] [facility] for meconium aspiration  Met with pt ([phone]) and FOB/ Val Gay ([phone]) to introduce CM services and provide CM contact info  Pt and FOB report they are doing well and this is 1st kid for couple who live together in [place], have good support system, have all baby supplies needed including breast pump, and have cars for transportation as needed  Pt and FOB are employed and pt has [employer] which she reports will cost an additional $800 per month to add baby to, therefore she will not add baby to her policy and she requests assistance applying for PA MA/CHIP for coverage for baby  As discussed with pt and FOB, PATHS referral request sent via secure email to financial counselors and billing staff for assistance with PA MA application  Pt and FOB agreeable to same and deny any other CM needs at this time  Encouraged family to contact CM as needed  No other needs noted  Will follow

## 2019-07-01 NOTE — PROGRESS NOTES
Rapid Response Follow up:    Rounded on patient d/t recent tx from CCU. Patient's MEWS elevated due to tachycardia, started on corlanor for HR today. Otherwise VSS, NAD. No RRT interventions indicated.     Cloteal Friendly BSN, RN, Yamilex Denise, JIMMY, CPEN  Rapid Response RN

## 2019-07-01 NOTE — PROGRESS NOTES
Bedside shift change report GIVEN TO Mae Boss RN. Report included the following information SBAR, Kardex, MAR and Cardiac Rhythm st.         SIGNIFICANT CHANGES DURING SHIFT:        CONCERNS TO ADDRESS WITH MD:            [facility] NURSING NOTE   Admission Date 6/28/2019   Admission Diagnosis Splenic laceration [S36.039A]   Consults IP CONSULT TO HOSPITALIST  IP CONSULT TO CARDIOLOGY  IP CONSULT TO ENDOCRINOLOGY      Cardiac Monitoring [x] Yes [] No      Purposeful Hourly Rounding [x] Yes    Hiwot Score Total Score: 3   Hiwot score 3 or > [x] Bed Alarm [] Avasys [] 1:1 sitter [] Patient refused (Signed refusal form in chart)   Xavier Score Xavier Score: 20   Xavier score 14 or < [] PMT consult [] Wound Care consult    []  Specialty bed  [] Nutrition consult      Influenza Vaccine Received Flu Vaccine for Current Season (usually Sept-March): Not Flu Season           Oxygen needs? [x] Room air Oxygen @  []1L    []2L    []3L   []4L    []5L   []6L via  NC   Chronic home O2 use?  [] Yes [] No  Perform O2 challenge test and document in progress note using smartphrase (.Homeoxygen)      Last bowel movement Last Bowel Movement Date: 06/30/19      Urinary Catheter       [REMOVED] External Female Catheter 06/28/19-Urine Output (mL): 0 ml     LDAs               Peripheral IV 06/29/19 Left;Upper Arm (Active)   Site Assessment Clean, dry, & intact 6/30/2019  7:55 PM   Phlebitis Assessment 0 6/30/2019  7:55 PM   Infiltration Assessment 0 6/30/2019  7:55 PM   Dressing Status Clean, dry, & intact 6/30/2019  7:55 PM   Dressing Type Transparent 6/30/2019  7:55 PM   Hub Color/Line Status Pink;Capped 6/30/2019  7:55 PM   Action Taken Other (comment) 6/29/2019  5:55 PM       Peripheral IV 06/29/19 Right Forearm (Active)   Site Assessment Clean, dry, & intact 6/30/2019  7:55 PM   Phlebitis Assessment 0 6/30/2019  7:55 PM   Infiltration Assessment 0 6/30/2019  7:55 PM   Dressing Status Clean, dry, & intact 6/30/2019  7:55 PM   Dressing Type Transparent 6/30/2019  7:55 PM   Hub Color/Line Status Pink;Capped 6/30/2019  7:55 PM   Action Taken Other (comment) 6/29/2019  5:55 PM                         Readmission Risk Assessment Tool Score Low Risk            3       Total Score        3 Has Seen PCP in Last 6 Months (Yes=3, No=0)        Criteria that do not apply:    . Living with Significant Other. Assisted Living. LTAC. SNF. or   Rehab    Patient Length of Stay (>5 days = 3)    IP Visits Last 12 Months (1-3=4, 4=9, >4=11)    Pt.  Coverage (Medicare=5 , Medicaid, or Self-Pay=4)    Charlson Comorbidity Score (Age + Comorbid Conditions)       Expected Length of Stay - - -   Actual Length of Stay 2

## 2019-07-01 NOTE — PROGRESS NOTES
Problem: Falls - Risk of  Goal: *Absence of Falls  Description  Document Candida Lightning Fall Risk and appropriate interventions in the flowsheet.   Outcome: Progressing Towards Goal  Note:   Fall Risk Interventions:  Mobility Interventions: Communicate number of staff needed for ambulation/transfer         Medication Interventions: Patient to call before getting OOB, Teach patient to arise slowly    Elimination Interventions: Bed/chair exit alarm, Call light in reach, Patient to call for help with toileting needs, Toileting schedule/hourly rounds    History of Falls Interventions: Bed/chair exit alarm, Consult care management for discharge planning, Door open when patient unattended         Problem: Patient Education: Go to Patient Education Activity  Goal: Patient/Family Education  Outcome: Progressing Towards Goal

## 2019-07-01 NOTE — ROUTINE PROCESS
PCP new pt apt scheduled on 7/12/19 at 9:30AM.  Pt is re-establishing care with the practice, last seen by Dr. Jag Venegas in 2014.   Apt added to AVS

## 2019-07-01 NOTE — PROGRESS NOTES
Problem: Falls - Risk of  Goal: *Absence of Falls  Description  Document Tamia Velasco Fall Risk and appropriate interventions in the flowsheet.   Outcome: Progressing Towards Goal     Problem: Patient Education: Go to Patient Education Activity  Goal: Patient/Family Education  Outcome: Progressing Towards Goal     Problem: Pain  Goal: *Control of Pain  Outcome: Progressing Towards Goal     Problem: Patient Education: Go to Patient Education Activity  Goal: Patient/Family Education  Outcome: Progressing Towards Goal

## 2019-07-01 NOTE — CONSULTS
CONSULTATION REQUESTED BY: Dr. Mal Tinoco: Evaluation of adrenal function    CHIEF COMPLAINT: spleen /abdominal injury, exclude adrenal insufficiency    HISTORY OF PRESENT ILLNESS:   Aram Friedman is a 34 y.o. female with a PMHx as noted below who was admitted to the hospital on 2019  3:42 AM with a diagnosis of Splenic laceration. Endocrinology was consulted for the evaluation of adrenal function. Consult received over the weekend. Patient was noted for a fall resulting in what is perceived to be a laceration of the spleen. The patient had been noted to have a tachycardia w/o hypotension. Today the patient noted that she things her tachycardia may have been present a few years ago but not certain. She is noted today to be sitting up, conversant, holding a conversation quite well. She has consumed most of her breakfast.     ACTH stimulation of cortisol completed yesterday. Her basal cortisol level was adequate she also reached appropriate stimulated levels at 30 and 60 minutes. This has excluded the possibility of active adrenal insufficiency and the results are reassuring. Results for Carine Johns (MRN [id number]) as of 2019 08:44   Ref. Range 2019 10:59   Cortisol, baseline Latest Units: ug/dL 11.1   Cortisol, 30 min. Latest Units: ug/dL 19.5   Cortisol, 60 min. Latest Units: ug/dL 24.2     Note that her TSH was normal suggesting stable thyroid function, and her blood sugars have likewise been stable.      PAST MEDICAL/SURGICAL HISTORY:   Past Medical History:   Diagnosis Date    Allergic rhinitis, cause unspecified     Iron deficiency anemia     during pregnancy    Obesity, Class I, BMI 30-34.9      Past Surgical History:   Procedure Laterality Date    HX  SECTION      for fetal distress (Dr. Aydin Luis)       ALLERGIES:   Allergies   Allergen Reactions    Penicillins Rash       MEDICATIONS ON ADMISSION:     Current Facility-Administered Medications:     potassium chloride SR (KLOR-CON 10) tablet 20 mEq, 20 mEq, Oral, BID, Brooklynn Hicks MD, 20 mEq at 06/30/19 1813    acetaminophen (TYLENOL) tablet 650 mg, 650 mg, Oral, Q4H PRN, Brooklynn Hicks MD, 650 mg at 06/29/19 2038    sodium chloride (NS) flush 5-40 mL, 5-40 mL, IntraVENous, Q8H, Brooklynn Hicks MD, 10 mL at 07/01/19 0600    sodium chloride (NS) flush 5-40 mL, 5-40 mL, IntraVENous, PRN, Brooklynn Hicks MD, 30 mL at 06/28/19 1046    naloxone [facility]) injection 0.4 mg, 0.4 mg, IntraVENous, PRN, Brooklynn Hicks MD    ondansetron [facility]) injection 4 mg, 4 mg, IntraVENous, Q4H PRN, Brooklynn Hicks MD    diphenhydrAMINE (BENADRYL) injection 50 mg, 50 mg, IntraVENous, Q6H PRN, Brooklynn Hicks MD    oxyCODONE-acetaminophen (PERCOCET) 5-325 mg per tablet 2 Tab, 2 Tab, Oral, Q4H PRN, Stacy Wheeler MD, 2 Tab at 07/01/19 0151    alcohol 62% (NOZIN) nasal  1 Ampule, 1 Ampule, Topical, Q12H, Brooklynn Hicks MD, 1 Ampule at 06/30/19 1110    SOCIAL HISTORY:   Social History     Socioeconomic History    Marital status: UNKNOWN     Spouse name: Not on file    Number of children: 1    Years of education: Not on file    Highest education level: Not on file   Occupational History    Occupation: [employer] -    Social Needs    Financial resource strain: Not on file    Food insecurity:     Worry: Not on file     Inability: Not on file   [employer] needs:     Medical: Not on file     Non-medical: Not on file   Tobacco Use    Smoking status: Never Smoker    Smokeless tobacco: Never Used   Substance and Sexual Activity    Alcohol use: Yes     Comment: social    Drug use: No    Sexual activity: Yes     Partners: Male     Birth control/protection: IUD   Lifestyle    Physical activity:     Days per week: Not on file     Minutes per session: Not on file    Stress: Not on file   Relationships    Social connections:     Talks on phone: Not on file     Gets together: Not on file     Attends Adventism service: Not on file     Active member of club or organization: Not on file     Attends meetings of clubs or organizations: Not on file     Relationship status: Not on file    Intimate partner violence:     Fear of current or ex partner: Not on file     Emotionally abused: Not on file     Physically abused: Not on file     Forced sexual activity: Not on file   Other Topics Concern    Not on file   Social History Narrative    Not on file       FAMILY HISTORY:  No family history on file. REVIEW OF SYSTEMS: Complete ROS assessed and noted for that which is described above, all else are negative. Eyes: normal  ENT: normal  CVS: normal  Resp: normal  GI: normal  : normal  GYN: normal  Endocrine: normal  Integument: normal  Musculoskeletal: normal  Neuro: normal  Psych: normal      PHYSICAL EXAMINATION:    VITAL SIGNS:  Visit Vitals  /67 (BP 1 Location: Right arm, BP Patient Position: At rest)   Pulse (!) 117   Temp 98.3 °F (36.8 °C)   Resp 20   Ht 5' 7\" (1.702 m)   Wt 274 lb (124.3 kg)   SpO2 95%   Breastfeeding? No   BMI 42.91 kg/m²       GENERAL: NCAT, Sitting comfortably, NAD  EYES: EOMI, non-icteric, no proptosis  Ear/Nose/Throat: NCAT, no inflammation, no masses  LYMPH NODES: No LAD  CARDIOVASCULAR: no JVD, No edema, normal peripheral perfusion  RESPIRATORY: no cyanosis, normal chest expansion, comfortable respirations  GASTROINTESTINAL: ND  MUSCULOSKELETAL: Normal ROM, no atrophy  SKIN: warm, no edema/rash/ or other skin changes  NEUROLOGIC: 5/5 power all extremities, no tremors, AAOx3  PSYCHIATRIC: Normal affect, Normal insight and judgement    REVIEW OF LABORATORY AND RADIOLOGY DATA:   Labs and documentation have been reviewed as described above.      ASSESSMENT AND PLAN:   Glenn Castillo is a 34 y.o. female with a PMHx as noted below who was admitted to the hospital on 6/28/2019  3:42 AM with a diagnosis of Splenic laceration. Endocrinology was consulted for the evaluation of adrenal function. Diagnoses:  Fall  Splenic injury    Assessment/Plan:  34 F s/p fall with splenic laceration, noted for appreciable tachycardia, evaluated for potential adrenal insufficiency with normal findings. Endocrine functions appear to be adequate. Your concerns and excellent care of this patient are noted and appreciated. Feel free to contact me with concerns,    Wally Postin.  [address] [facility]

## 2019-07-01 NOTE — PROGRESS NOTES
RAPID RESPONSE TEAM    Rounded on patient due to recent transfer out of CCU on 6/30/19. Patient also has a MEWS score of 4 related to low grade fever, tachycardia, and tachypnea. Cardiology is following for tachycardia. Patient Vitals for the past 12 hrs:   Temp Pulse Resp BP SpO2   06/30/19 1908 99.1 °F (37.3 °C) (!) 119 22 114/68 93 %   06/30/19 1825 99.7 °F (37.6 °C) (!) 125 20 113/57 96 %   06/30/19 1451 98.1 °F (36.7 °C) (!) 113 20 100/65 97 %   06/30/19 1223 97.6 °F (36.4 °C) (!) 109 22 110/70 97 %     Discussed with primary RN Annalise Evans. No acute concerns. Patient has slight dyspnea on exertion, but otherwise has no complaints. RN encouraged to call with any questions or concerns. Addendum:    2240  Repeat MEWS 3.    Visit Vitals  /68 (BP 1 Location: Right arm, BP Patient Position: At rest)   Pulse (!) 117   Temp 98.4 °F (36.9 °C)   Resp 20   Ht 5' 7\" (1.702 m)   Wt 124.3 kg (274 lb)   SpO2 91%   Breastfeeding?  No   BMI 42.91 kg/m²       Ivory Rock  Rapid Response RN  Ext. [phone]

## 2019-07-01 NOTE — PROGRESS NOTES
Hospitalist Consult Service Progress Note    NAME: Donte Alcantara   :  1990   MRN:  [id number]       Assessment / Plan:  Inappropriate Sinus tachycardia POA- chronic   -unclear exact etiology at present. Appears to be chronic issue at least since   Per PCP notes HR ~ 100-115 but pt was not aware about it   BP stable, not orthostatic   TSH normal   Hb 9.5 but stable, doubt it will cause that level of tachycardia. ECG: sinus tachycardia    CTA chest: no PE   She is not in pain     IP Cardiology consulted- ACTH stim test negative per Endocrine- pt started on Corlanor today AM-- observation on telemetry today- if tolerated cleared for DC in AM per cardiology  Check 2Decho when available    Will SIGN OFF today as not much to add to plan of care  Recall as needed     Splenic laceration POA  -mononucleosis screen negative   -management per primary team - gen surgery  No plans of intervention as pt not actively bleeding per imaging  Plan to advanced diet noted- tolerating well  DC in 24 hrs per primary team     Hypokalemia POA-resolved, 3.8 now     Hypocalcemia POA- improved   -continue to closely monitor  -per PCP notes h/o Vit D deficiency, but pt cannot recall being told about it and is not on Vit D supplement  -check Vit D level     Anemia  -Acute due to above on chronic iron deficiency anemia   -Hb stable now- 10 today  -check iron level, if low consider iron IV            Code Status: Full code   DVT Prophylaxis: per primary team       40 or above Morbid obesity / Body mass index is 42.91 kg/m². Code status: Full  Prophylaxis: SCD's  Recommended Disposition: Home w/Family in 24 hrs by Surgery team anticipated     Subjective:     Chief Complaint / Reason for Physician Visit :F/U Splenic laceration, Sinus tachycardia (Chronic)  \"I am fine\". Discussed with RN events overnight.      Review of Systems:  Symptom Y/N Comments  Symptom Y/N Comments   Fever/Chills n   Chest Pain n    Poor Appetite n Edema n    Cough n   Abdominal Pain n    Sputum n   Joint Pain n    SOB/MENDIOLA n   Pruritis/Rash     Nausea/vomit    Tolerating PT/OT y    Diarrhea    Tolerating Diet y Liquid diet   Constipation    Other       Could NOT obtain due to:      Objective:     VITALS:   Last 24hrs VS reviewed since prior progress note. Most recent are:  Patient Vitals for the past 24 hrs:   Temp Pulse Resp BP SpO2   07/01/19 1027 98.1 °F (36.7 °C) (!) 115 22 113/61 95 %   07/01/19 0716 98.3 °F (36.8 °C) (!) 117 20 123/67 95 %   07/01/19 0151 99.6 °F (37.6 °C) (!) 119 20 103/59 95 %   06/30/19 2236 98.4 °F (36.9 °C) (!) 117 20 106/68 91 %   06/30/19 1908 99.1 °F (37.3 °C) (!) 119 22 114/68 93 %   06/30/19 1825 99.7 °F (37.6 °C) (!) 125 20 113/57 96 %   06/30/19 1451 98.1 °F (36.7 °C) (!) 113 20 100/65 97 %   06/30/19 1223 97.6 °F (36.4 °C) (!) 109 22 110/70 97 %       Intake/Output Summary (Last 24 hours) at 7/1/2019 1036  Last data filed at 7/1/2019 0849  Gross per 24 hour   Intake 960 ml   Output 1150 ml   Net -190 ml        PHYSICAL EXAM:  General: WD, WN. Alert, cooperative, no acute distress    EENT:  EOMI. Anicteric sclerae. MMM  Resp:  CTA bilaterally, no wheezing or rales. No accessory muscle use  CV:  Regular  rhythm,  No edema  GI:  Soft, Non distended, Non tender.  +Bowel sounds  Neurologic:  Alert and oriented X 3, normal speech,   Psych:   Good insight. Not anxious nor agitated  Skin:  No rashes.   No jaundice    Reviewed most current lab test results and cultures  YES  Reviewed most current radiology test results   YES  Review and summation of old records today    NO  Reviewed patient's current orders and MAR    YES  PMH/SH reviewed - no change compared to H&P  ________________________________________________________________________  Care Plan discussed with:    Comments   Patient x    Family      RN x    Care Manager     Consultant  x Dr Cara [place] (surgery)                     Multidiciplinary team rounds were held today with case manager, nursing, pharmacist and clinical coordinator. Patient's plan of care was discussed; medications were reviewed and discharge planning was addressed. ________________________________________________________________________  Total NON critical care TIME:  26   Minutes    Total CRITICAL CARE TIME Spent:   Minutes non procedure based      Comments   >50% of visit spent in counseling and coordination of care     ________________________________________________________________________  Alistair Garibay MD     Procedures: see electronic medical records for all procedures/Xrays and details which were not copied into this note but were reviewed prior to creation of Plan. LABS:  I reviewed today's most current labs and imaging studies.   Pertinent labs include:  Recent Labs     07/01/19  0156 06/30/19 0227 06/29/19  1211 06/29/19  0420   WBC 7.9 6.0  --  5.6   HGB 10.0* 9.9* 9.7* 9.5*   HCT 30.7* 31.1*  --  30.0*   * 126*  --  127*     Recent Labs     06/30/19 0227 06/29/19  0420    141   K 3.8 3.0*   * 115*   CO2 21 20*   GLU 95 83   BUN 4* 3*   CREA 0.69 0.41*   CA 7.4* 6.0*       Signed: Alistair Garibay MD

## 2019-07-01 NOTE — PROGRESS NOTES
Bedside and Verbal shift change report GIVEN TO Messi Adair RN. Report included the following information SBAR, Kardex, Intake/Output and Cardiac Rhythm sinus tach. SIGNIFICANT CHANGES DURING SHIFT:        CONCERNS TO ADDRESS WITH MD:            [facility] NURSING NOTE   Admission Date 6/28/2019   Admission Diagnosis Splenic laceration [S36.039A]   Consults IP CONSULT TO HOSPITALIST  IP CONSULT TO CARDIOLOGY  IP CONSULT TO ENDOCRINOLOGY      Cardiac Monitoring [x] Yes [] No      Purposeful Hourly Rounding [x] Yes    Hiwot Score Total Score: 3   Hiwot score 3 or > [x] Bed Alarm [] Avasys [] 1:1 sitter [] Patient refused (Signed refusal form in chart)   Xavier Score Xavier Score: 20   Xavier score 14 or < [] PMT consult [] Wound Care consult    []  Specialty bed  [] Nutrition consult      Influenza Vaccine Received Flu Vaccine for Current Season (usually Sept-March): Not Flu Season           Oxygen needs? [x] Room air Oxygen @  []1L    []2L    []3L   []4L    []5L   []6L via  NC   Chronic home O2 use?  [] Yes [] No  Perform O2 challenge test and document in progress note using [employer] (.Homeoxygen)      Last bowel movement Last Bowel Movement Date: 06/30/19      Urinary Catheter       [REMOVED] External Female Catheter 06/28/19-Urine Output (mL): 0 ml     LDAs               Peripheral IV 06/29/19 Left;Upper Arm (Active)   Site Assessment Clean, dry, & intact 6/30/2019  7:55 PM   Phlebitis Assessment 0 6/30/2019  7:55 PM   Infiltration Assessment 0 6/30/2019  7:55 PM   Dressing Status Clean, dry, & intact 6/30/2019  7:55 PM   Dressing Type Transparent 6/30/2019  7:55 PM   Hub Color/Line Status Pink;Capped 6/30/2019  7:55 PM   Action Taken Other (comment) 6/29/2019  5:55 PM       Peripheral IV 06/29/19 Right Forearm (Active)   Site Assessment Clean, dry, & intact 6/30/2019  7:55 PM   Phlebitis Assessment 0 6/30/2019  7:55 PM   Infiltration Assessment 0 6/30/2019  7:55 PM   Dressing Status Clean, dry, & intact 6/30/2019  7:55 PM   Dressing Type Transparent 6/30/2019  7:55 PM   Hub Color/Line Status Pink;Capped 6/30/2019  7:55 PM   Action Taken Other (comment) 6/29/2019  5:55 PM                         Readmission Risk Assessment Tool Score Low Risk            3       Total Score        3 Has Seen PCP in Last 6 Months (Yes=3, No=0)        Criteria that do not apply:    . Living with Significant Other. Assisted Living. LTAC. SNF. or   Rehab    Patient Length of Stay (>5 days = 3)    IP Visits Last 12 Months (1-3=4, 4=9, >4=11)    Pt.  Coverage (Medicare=5 , Medicaid, or Self-Pay=4)    Charlson Comorbidity Score (Age + Comorbid Conditions)       Expected Length of Stay - - -   Actual Length of Stay 2

## 2019-07-01 NOTE — PROGRESS NOTES
Cardiac Electrophysiology Progress Note            [address] [facility], [address]  [phone]    7/1/2019 9:10 AM    Admit Date: 6/28/2019    Admit Diagnosis: Splenic laceration [S36.039A]    Subjective:     Gallo Mari   Reports dyspnea with exertion. Feels well at rest.    Per endocrine: ACTH stimulation of cortisol completed yesterday. Her basal cortisol level was adequate she also reached appropriate stimulated levels at 30 and 60 minutes. This has excluded the possibility of active adrenal insufficiency and the results are reassuring. Visit Vitals  /67 (BP 1 Location: Right arm, BP Patient Position: At rest)   Pulse (!) 117   Temp 98.3 °F (36.8 °C)   Resp 20   Ht 5' 7\" (1.702 m)   Wt 274 lb (124.3 kg)   SpO2 95%   Breastfeeding? No   BMI 42.91 kg/m²     Current Facility-Administered Medications   Medication Dose Route Frequency    potassium chloride SR (KLOR-CON 10) tablet 20 mEq  20 mEq Oral BID    acetaminophen (TYLENOL) tablet 650 mg  650 mg Oral Q4H PRN    sodium chloride (NS) flush 5-40 mL  5-40 mL IntraVENous Q8H    sodium chloride (NS) flush 5-40 mL  5-40 mL IntraVENous PRN    naloxone (NARCAN) injection 0.4 mg  0.4 mg IntraVENous PRN    ondansetron (ZOFRAN) injection 4 mg  4 mg IntraVENous Q4H PRN    diphenhydrAMINE (BENADRYL) injection 50 mg  50 mg IntraVENous Q6H PRN    oxyCODONE-acetaminophen (PERCOCET) 5-325 mg per tablet 2 Tab  2 Tab Oral Q4H PRN         Objective:      Visit Vitals  /67 (BP 1 Location: Right arm, BP Patient Position: At rest)   Pulse (!) 117   Temp 98.3 °F (36.8 °C)   Resp 20   Ht 5' 7\" (1.702 m)   Wt 274 lb (124.3 kg)   SpO2 95%   Breastfeeding?  No   BMI 42.91 kg/m²       Physical Exam:  Abdomen: soft, non-tender  Extremities: extremities normal  Heart:tachycardic  Lungs: clear to auscultation bilaterally  Pulses: 2+ and symmetric    Data Review:   Labs:    Recent Labs     07/01/19  0156 06/30/19  0227 06/29/19  1211 06/29/19  0420   WBC 7.9 6.0  --  5.6   HGB 10.0* 9.9* 9.7* 9.5*   HCT 30.7* 31.1*  --  30.0*   * 126*  --  127*     Recent Labs     06/30/19  0227 06/29/19  0420    141   K 3.8 3.0*   * 115*   CO2 21 20*   GLU 95 83   BUN 4* 3*   CREA 0.69 0.41*   CA 7.4* 6.0*       No results for input(s): TROIQ, CPK, CKMB in the last 72 hours. Intake/Output Summary (Last 24 hours) at 7/1/2019 0910  Last data filed at 7/1/2019 0849  Gross per 24 hour   Intake 960 ml   Output 1150 ml   Net -190 ml        Telemetry:    Echo:  · Pericardium: There is left pleural effusion. · Left Ventricle: Normal cavity size and wall thickness. Estimated left ventricular ejection fraction is 61 - 65%. · Mitral Valve: Trace mitral valve regurgitation. Assessment:     Active Problems:    Tachycardia (7/23/2012)      Overview: due to mild dehydration vs other      Splenic laceration (6/28/2019)        Plan:     Emery Neumann is a pleasant 34year old female with tachycardia. Ef normal per echo. H/H stable. ACTH test completed yesterday excluded the possibility of active adrenal insufficiency. Will add corlanor. [id number] Bre Rose for d/c tomorrow. Destini Stafford, DAVID  Patient seen and examined by me with nurse practitioner. I personally performed all components of the history, physical, and medical decision making and agree with the assessment and plan with minor modifications as noted. Acth test wnl. Will start corlanor.  [id number] Bre Rose for [employer], MD, [facility]        7/1/2019  9:10 AM

## 2019-07-01 NOTE — PROGRESS NOTES
0700: Bedside shift change report given to Babak Urbina (oncoming nurse) by Dennise Silverman (offgoing nurse). Report included the following information SBAR, Kardex, Intake/Output, MAR, Recent Results and Cardiac Rhythm Sinus Tach. 2525: MEWS 3 due to elevated HR. Cardiology aware. Will continue to monitor. 1027: MEWS 4 due to elevated HR. Cardiology aware. Will continue to monitor. 1503: MEWS 3 due to elevated HR. Cardiology aware. Will continue to monitor.

## 2019-07-02 VITALS
OXYGEN SATURATION: 93 % | HEIGHT: 67 IN | SYSTOLIC BLOOD PRESSURE: 105 MMHG | BODY MASS INDEX: 43 KG/M2 | HEART RATE: 97 BPM | TEMPERATURE: 98.1 F | WEIGHT: 274 LBS | RESPIRATION RATE: 18 BRPM | DIASTOLIC BLOOD PRESSURE: 59 MMHG

## 2019-07-02 LAB
BACTERIA SPEC CULT: NORMAL
CC UR VC: NORMAL
SERVICE CMNT-IMP: NORMAL

## 2019-07-02 PROCEDURE — 99238 HOSP IP/OBS DSCHRG MGMT 30/<: CPT | Performed by: SURGERY

## 2019-07-02 PROCEDURE — 74011250637 HC RX REV CODE- 250/637: Performed by: INTERNAL MEDICINE

## 2019-07-02 PROCEDURE — 74011250637 HC RX REV CODE- 250/637: Performed by: SURGERY

## 2019-07-02 RX ADMIN — POTASSIUM CHLORIDE 20 MEQ: 750 TABLET, FILM COATED, EXTENDED RELEASE ORAL at 10:00

## 2019-07-02 RX ADMIN — Medication 10 ML: at 05:10

## 2019-07-02 RX ADMIN — IVABRADINE 2.5 MG: 5 TABLET, FILM COATED ORAL at 10:00

## 2019-07-02 RX ADMIN — OXYCODONE AND ACETAMINOPHEN 2 TABLET: 5; 325 TABLET ORAL at 04:04

## 2019-07-02 NOTE — DISCHARGE INSTRUCTIONS
Patient Education        Spleen Injury: Care Instructions  Your Care Instructions    The spleen is an organ in the upper left side of your belly. It filters old and damaged blood cells from your blood. It also helps your immune system by removing some types of bacteria from your bloodstream.  A spleen injury can cause bleeding inside your body. Bruising and cuts on the spleen can cause swelling and pain in the upper left part of the chest or shoulder. Treatment depends on how severe the injury is. Your spleen was not injured badly enough to need to be removed with surgery. Your treatment includes careful observation in the hospital to watch for bleeding. It might also include a procedure to block some blood flow to parts of your spleen. This helps control bleeding while your spleen heals. The doctor has checked you carefully, but problems may develop later. If you notice any problems or new symptoms, get medical treatment right away. Follow-up care is a key part of your treatment and safety. Be sure to make and go to all appointments, and call your doctor if you are having problems. It's also a good idea to know your test results and keep a list of the medicines you take. How can you care for yourself at home? · Get plenty of rest for 2 to 3 months while your spleen heals. · Avoid strenuous activities that could re-injure your spleen. These include lifting, jogging, aerobic exercise, and contact sports. · Ask your doctor when you can go back to work, school, or your regular activities. · Be safe with medicines. Read and follow all instructions on the label. ? If the doctor gave you a prescription medicine for pain, take it as prescribed. ? If you are not taking a prescription pain medicine, ask your doctor if you can take an over-the-counter medicine. · Ask your doctor before you take aspirin or NSAIDs like ibuprofen. These medicines can make bleeding worse. When should you call for help?   Call 46 anytime you think you may need emergency care. For example, call if:    · You passed out (lost consciousness).     · You have severe pain in your belly.    Call your doctor now or seek immediate medical care if:    · You are vomiting.     · You have new or worse belly pain.     · You are dizzy or light-headed, or you feel like you may faint.    Watch closely for changes in your health, and be sure to contact your doctor if:    · You do not get better as expected. Where can you learn more? Go to http://cynthia-andres.info/. Enter U967 in the search box to learn more about \"Spleen Injury: Care Instructions. \"  Current as of: March 27, 2018  Content Version: 11.9  © 5733-7604 [employer]. Care instructions adapted under license by "[employer] (which disclaims liability or warranty for this information). If you have questions about a medical condition or this instruction, always ask your healthcare professional. [address] any warranty or liability for your use of this information.

## 2019-07-02 NOTE — DISCHARGE SUMMARY
Physician Discharge Summary     Patient ID:  Martha Maxwell  [id number]  23 y.o.  1990    Admit Date: 6/28/2019    Discharge Date: 7/2/2019    Admission Diagnoses: Splenic laceration [S36.039A]    Discharge Diagnoses: Active Problems:    Tachycardia (7/23/2012)      Overview: due to mild dehydration vs other      Splenic laceration (6/28/2019)         Admission Condition: Poor    Discharge Condition: Good    Last Procedure: * No surgery found [facility] Course:   Pt admitted with splenic laceration without evidence of ongoing extravasation, with small perisplenic hematoma. Also with SVT which was longstanding. Seen by cardiology, started on Corlanor. H/h stable. Now suitable for d/c home with light duty for 2 more weeks. Consults: Cardiology    Disposition: home    Patient Instructions:   Current Discharge Medication List      START taking these medications    Details   ivabradine (CORLANOR) 5 mg tablet Take 0.5 Tabs by mouth two (2) times daily (with meals). Qty: 30 Tab, Refills: 0           Activity: No heavy lifting for 2 weeks  Diet: Regular Diet  Wound Care: None needed    Follow-up with Dr. Mervat Lopez in 1-2 weeks.   Follow-up tests/labs none    Signed:  Carmen Martell MD  [facility]  7/2/2019  9:30 AM

## 2019-07-02 NOTE — PROGRESS NOTES
Patient discharged. Pt given follow-up instructions, medications list and prescriptions. Pt able to demonstrate understanding of discharge instructions. Pt IV's and Telemonitor removed. Pt left with all personal belongings. Pt left unit with family.

## 2019-07-02 NOTE — PROGRESS NOTES
Problem: Falls - Risk of  Goal: *Absence of Falls  Description  Document Valerie Kimbrough Fall Risk and appropriate interventions in the flowsheet.   Outcome: Resolved/Met     Problem: Patient Education: Go to Patient Education Activity  Goal: Patient/Family Education  Outcome: Resolved/Met     Problem: Pain  Goal: *Control of Pain  Outcome: Resolved/Met     Problem: Patient Education: Go to Patient Education Activity  Goal: Patient/Family Education  Outcome: Resolved/Met

## 2019-07-02 NOTE — PROGRESS NOTES
Cardiology Progress Note            [address] [facility], [address]  [phone]    7/2/2019 8:00 AM    Admit Date: 6/28/2019    Admit Diagnosis: Splenic laceration [S36.039A]    Subjective:     Reji Craft   denies chest pain. Visit Vitals  /59 (BP 1 Location: Right arm, BP Patient Position: At rest)   Pulse 97   Temp 98.1 °F (36.7 °C)   Resp 18   Ht 5' 7\" (1.702 m)   Wt 274 lb (124.3 kg)   SpO2 93%   Breastfeeding? No   BMI 42.91 kg/m²     Current Facility-Administered Medications   Medication Dose Route Frequency    ivabradine (CORLANOR) tablet 2.5 mg  2.5 mg Oral BID WITH MEALS    potassium chloride SR (KLOR-CON 10) tablet 20 mEq  20 mEq Oral BID    acetaminophen (TYLENOL) tablet 650 mg  650 mg Oral Q4H PRN    sodium chloride (NS) flush 5-40 mL  5-40 mL IntraVENous Q8H    sodium chloride (NS) flush 5-40 mL  5-40 mL IntraVENous PRN    naloxone (NARCAN) injection 0.4 mg  0.4 mg IntraVENous PRN    ondansetron (ZOFRAN) injection 4 mg  4 mg IntraVENous Q4H PRN    diphenhydrAMINE (BENADRYL) injection 50 mg  50 mg IntraVENous Q6H PRN    oxyCODONE-acetaminophen (PERCOCET) 5-325 mg per tablet 2 Tab  2 Tab Oral Q4H PRN         Objective:      Visit Vitals  /59 (BP 1 Location: Right arm, BP Patient Position: At rest)   Pulse 97   Temp 98.1 °F (36.7 °C)   Resp 18   Ht 5' 7\" (1.702 m)   Wt 274 lb (124.3 kg)   SpO2 93%   Breastfeeding?  No   BMI 42.91 kg/m²       Physical Exam:  Abdomen: soft, non-tender  Extremities: extremities normal  Heart: regular rate and rhythm  Lungs: clear to auscultation bilaterally  Pulses: 2+ and symmetric    Data Review:   Labs:    Recent Labs     07/01/19  0156 06/30/19  0227 06/29/19  1211   WBC 7.9 6.0  --    HGB 10.0* 9.9* 9.7*   HCT 30.7* 31.1*  --    * 126*  --      Recent Labs     06/30/19  0227      K 3.8   *   CO2 21   GLU 95   BUN 4*   CREA 0.69   CA 7.4*       No results for input(s): TROIQ, CPK, CKMB in the last 72 hours.      Intake/Output Summary (Last 24 hours) at 7/2/2019 0800  Last data filed at 7/1/2019 2244  Gross per 24 hour   Intake 700 ml   Output 200 ml   Net 500 ml        Telemetry: nsr    Assessment:     Active Problems:    Tachycardia (7/23/2012)      Overview: due to mild dehydration vs other      Splenic laceration (6/28/2019)        Plan:     Chavez Faulkner 's rate is in the 90s this am. Cont with corlanor. [id number] Bre Rose for [employer] from cardiology standpoint.  F/u as outpatient in 1-2 weeks    Henrik Diego MD, [facility]    7/2/2019

## 2019-07-02 NOTE — PROGRESS NOTES
Bedside report given to KASSIE Lisa that included SBAR and cardiac rhythm. No acute events overnight.

## 2019-07-02 NOTE — PROGRESS NOTES
DEDE plan: Pt will discharge home today, transported by her father in a personal vehicle. Pt is scheduled to follow-up with her PCP on 7/12/19 and Cardiologist on 7/9/19. No further needs indicated at this time. AVS updated. Care Management Interventions  PCP Verified by CM: Yes  Mode of Transport at Discharge: Other (see comment)(father)  Transition of Care Consult (CM Consult):  Other(PCP/Cardio f/u appts)  MyChart Signup: No  Discharge Durable Medical Equipment: No  Physical Therapy Consult: No  Occupational Therapy Consult: No  Speech Therapy Consult: No  Current Support Network: Own Home, Lives Alone(2nd floor apartment, stairs to access)  Confirm Follow Up Transport: Family  Plan discussed with Pt/Family/Caregiver: Yes  Discharge Location  Discharge Placement: [address] [place] [address], [facility]  [place] Manager  [phone]

## 2019-07-02 NOTE — PROGRESS NOTES
Problem: Falls - Risk of  Goal: *Absence of Falls  Description  Document Radha Del Cidable Fall Risk and appropriate interventions in the flowsheet.   Outcome: Progressing Towards Goal     Problem: Patient Education: Go to Patient Education Activity  Goal: Patient/Family Education  Outcome: Progressing Towards Goal     Problem: Pain  Goal: *Control of Pain  Outcome: Progressing Towards Goal     Problem: Patient Education: Go to Patient Education Activity  Goal: Patient/Family Education  Outcome: Progressing Towards Goal

## 2019-07-02 NOTE — ANCILLARY DISCHARGE INSTRUCTIONS
7/2/2019 RE: Ana Ram To Whom it May Concern: This is to certify that Ana Ram was admitted to the hospital and may return to work on 7/3/19. She should not do any strenuous activity or lifting for 2 weeks. Please feel free to contact my office if you have any questions or concerns. Thank you for your assistance in this matter. Sincerely, 
 
 
Zohreh Crespo MD, [address] [facility]. [facility] [phone]

## 2019-07-04 LAB
25(OH)D2 SERPL-MCNC: <1 NG/ML
25(OH)D3 SERPL-MCNC: 4 NG/ML
25(OH)D3+25(OH)D2 SERPL-MCNC: 4.3 NG/ML
BACTERIA SPEC CULT: NORMAL
SERVICE CMNT-IMP: NORMAL

## 2019-07-08 ENCOUNTER — TELEPHONE (OUTPATIENT)
Dept: SURGERY | Age: 29
End: 2019-07-08

## 2019-07-08 NOTE — TELEPHONE ENCOUNTER
Ms Bryan Ricks states that Corlanor 5 mg requires authorization by her insurance or a different medication. Spoke with Dr Dawn Jarvis, since pt is seeing cardiologist on 7/9/19, she should check with that doctor concerning any changes in this medication.

## 2019-07-09 ENCOUNTER — OFFICE VISIT (OUTPATIENT)
Dept: CARDIOLOGY CLINIC | Age: 29
End: 2019-07-09

## 2019-07-09 VITALS
SYSTOLIC BLOOD PRESSURE: 130 MMHG | DIASTOLIC BLOOD PRESSURE: 82 MMHG | BODY MASS INDEX: 42.91 KG/M2 | RESPIRATION RATE: 20 BRPM | WEIGHT: 273.4 LBS | HEART RATE: 106 BPM | OXYGEN SATURATION: 99 % | HEIGHT: 67 IN

## 2019-07-09 DIAGNOSIS — R00.0 TACHYCARDIA: Primary | ICD-10-CM

## 2019-07-09 DIAGNOSIS — R06.09 DOE (DYSPNEA ON EXERTION): ICD-10-CM

## 2019-07-09 DIAGNOSIS — E66.01 OBESITY, MORBID (HCC): ICD-10-CM

## 2019-07-09 DIAGNOSIS — R00.0 INAPPROPRIATE SINUS TACHYCARDIA: ICD-10-CM

## 2019-07-09 DIAGNOSIS — S36.039A LACERATION OF SPLEEN, INITIAL ENCOUNTER: ICD-10-CM

## 2019-07-09 RX ORDER — 1.1% SODIUM FLUORIDE PRESCRIPTION DENTAL CREAM 5 MG/G
CREAM DENTAL
Refills: 4 | COMMUNITY
Start: 2019-05-10

## 2019-07-09 RX ORDER — ACEBUTOLOL HYDROCHLORIDE 200 MG/1
200 CAPSULE ORAL 2 TIMES DAILY
Qty: 180 CAP | Refills: 1 | Status: SHIPPED | OUTPATIENT
Start: 2019-07-09

## 2019-07-09 NOTE — PROGRESS NOTES
Subjective:      Redd Isaacs is a 34 y.o. female is here for follow up from hospitalization for splenic laceration where she was started on Corlanor for IST. She report shortness of breath with exertion. The patient denies chest pain, orthopnea, PND, LE edema, palpitations, syncope, presyncope or fatigue. Patient Active Problem List    Diagnosis Date Noted    Obesity, morbid ([facility] [address] 2019    Splenic laceration 2019    Low back strain 2014    Insomnia 2012    Obesity, Class I, BMI 30-34.9 2012    Vitamin D deficiency 2012    Tachycardia 2012      Jazmin Larios DO  Past Medical History:   Diagnosis Date    Allergic rhinitis, cause unspecified     Iron deficiency anemia     during pregnancy    Obesity, Class I, BMI 30-34.9     Spleen laceration, sequela 2019    SVT (supraventricular tachycardia) ([facility] [address] 2019      Past Surgical History:   Procedure Laterality Date    HX  SECTION      for fetal distress (Dr. Faizan Yee)     Allergies   Allergen Reactions    Penicillins Rash      History reviewed. No pertinent family history. negative for cardiac disease  Social History     Socioeconomic History    Marital status: UNKNOWN     Spouse name: Not on file    Number of children: 1    Years of education: Not on file    Highest education level: Not on file   Occupational History    Occupation: [employer] -    Tobacco Use    Smoking status: Never Smoker    Smokeless tobacco: Never Used   Substance and Sexual Activity    Alcohol use: Yes     Comment: social    Drug use: No    Sexual activity: Yes     Partners: Male     Birth control/protection: IUD     Current Outpatient Medications   Medication Sig    SF 5000 PLUS 1.1 % crea USE ONCE DAILY UTD    acebutolol (SECTRAL) 200 mg capsule Take 1 Cap by mouth two (2) times a day. No current facility-administered medications for this visit. Vitals:    07/09/19 1256   BP: 130/82   Pulse: (!) 106   Resp: 20   SpO2: 99%   Weight: 273 lb 6.4 oz (124 kg)   Height: 5' 7\" (1.702 m)       I have reviewed the nurses notes, vitals, problem list, allergy list, medical history, family, social history and medications. Review of Symptoms:    General: Pt denies excessive weight gain or loss. Pt is able to conduct ADL's  HEENT: Denies blurred vision, headaches, epistaxis and difficulty swallowing. Respiratory: Denies shortness of breath, MENDIOLA, wheezing or stridor. Cardiovascular: Denies precordial pain, palpitations, edema or PND  Gastrointestinal: Denies poor appetite, indigestion, abdominal pain or blood in stool  Urinary: Denies dysuria, pyuria  Musculoskeletal: Denies pain or swelling from muscles or joints  Neurologic: Denies tremor, paresthesias, or sensory motor disturbance  Skin: Denies rash, itching or texture change. Psych: Denies depression      Physical Exam:      General: Well developed, in no acute distress. HEENT: Eyes - PERRL, no jvd  Heart:  Normal S1/S2 negative S3 or S4. Regular, no murmur, gallop or rub. Respiratory: Clear bilaterally x 4, no wheezing or rales  Abdomen:   Soft, non-tender, bowel sounds are active. Extremities:  No edema, normal cap refill, no cyanosis. Musculoskeletal: No clubbing  Neuro: A&Ox3, speech clear, gait stable. Skin: Skin color is normal. No rashes or lesions.  Non diaphoretic  Vascular: 2+ pulses symmetric in all extremities    Cardiographics    Ekg: sinus tachycardia    Results for orders placed or performed during the hospital encounter of 06/28/19   EKG, 12 LEAD, INITIAL   Result Value Ref Range    Ventricular Rate 128 BPM    Atrial Rate 128 BPM    P-R Interval 122 ms    QRS Duration 72 ms    Q-T Interval 312 ms    QTC Calculation (Bezet) 455 ms    Calculated P Axis 38 degrees    Calculated R Axis -24 degrees    Calculated T Axis 9 degrees    Diagnosis       Sinus tachycardia  No previous ECGs available  Confirmed by Kaia Reyes ([id number]) on 6/28/2019 4:57:48 PM           Lab Results   Component Value Date/Time    WBC 7.9 07/01/2019 01:56 AM    HGB 10.0 (L) 07/01/2019 01:56 AM    Hematocrit (POC) 35 06/28/2019 04:26 AM    HCT 30.7 (L) 07/01/2019 01:56 AM    PLATELET 739 (L) 55/03/8269 01:56 AM    MCV 83.9 07/01/2019 01:56 AM      Lab Results   Component Value Date/Time    Sodium 136 06/30/2019 02:27 AM    Potassium 3.8 06/30/2019 02:27 AM    Chloride 110 (H) 06/30/2019 02:27 AM    CO2 21 06/30/2019 02:27 AM    Anion gap 5 06/30/2019 02:27 AM    Glucose 95 06/30/2019 02:27 AM    BUN 4 (L) 06/30/2019 02:27 AM    Creatinine 0.69 06/30/2019 02:27 AM    BUN/Creatinine ratio 6 (L) 06/30/2019 02:27 AM    GFR est AA >60 06/30/2019 02:27 AM    GFR est non-AA >60 06/30/2019 02:27 AM    Calcium 7.4 (L) 06/30/2019 02:27 AM    Bilirubin, total 0.5 06/28/2019 04:08 AM    AST (SGOT) 92 (H) 06/28/2019 04:08 AM    Alk. phosphatase 127 (H) 06/28/2019 04:08 AM    Protein, total 6.7 06/28/2019 04:08 AM    Albumin 3.0 (L) 06/28/2019 04:08 AM    Globulin 3.7 06/28/2019 04:08 AM    A-G Ratio 0.8 (L) 06/28/2019 04:08 AM    ALT (SGPT) 207 (H) 06/28/2019 04:08 AM         Assessment:     Assessment:        ICD-10-CM ICD-9-CM    1. Tachycardia R00.0 785.0 AMB POC EKG ROUTINE W/ 12 LEADS, INTER & REP   2. Obesity, morbid (HCC) E66.01 278.01    3. Laceration of spleen, initial encounter S36.039A 865.09    4. MENDIOLA (dyspnea on exertion) R06.09 786.09    5. Inappropriate sinus tachycardia R00.0 427.89      Orders Placed This Encounter    AMB POC EKG ROUTINE W/ 12 LEADS, INTER & REP     Order Specific Question:   Reason for Exam:     Answer:   ROUTINE    SF 5000 PLUS 1.1 % crea     Sig: USE ONCE DAILY UTD     Refill:  4    acebutolol (SECTRAL) 200 mg capsule     Sig: Take 1 Cap by mouth two (2) times a day.      Dispense:  180 Cap     Refill:  1        Plan:   Ms. Odessa Andres is here for follow up of inappropriate sinus tachycardia diagnose during recent admission for splenic laceration. Ef normal per echo. ACTH test excluded the possibility of active adrenal insufficiency. Her labwork was normal. Corlanor was added; however, insurance has denied coverage. EKG shows sinus tachycardia and she has some shortness of breath with exertion. Will start acebutolol 200mg BID. Continue medical management for obesity, MENDIOLA. Thank you for allowing me to participate in Desmond Cobb 's care. Drew Escamilla MD    Patient seen and examined by me with nurse practitioner. I personally performed all components of the history, physical, and medical decision making and agree with the assessment and plan with minor modifications as noted. Pt is running out of her corlanor tmrw and insurance has denied coverage. Will try acebutalol. She will f/u in 4 weeks to reeval. Encouraged diet and exercise for weight loss.     Drew Escamilla MD, Jia Gould

## 2019-07-09 NOTE — PROGRESS NOTES
Reviewed record in preparation for visit and obtained necessary documentation. Verified patient with 2 identifiers   Chief Complaint   Patient presents with    New Patient     Seen in ED [facility] for SVC    Shortness of Breath     Upon exertion     1. Have you been to the ER, urgent care clinic since your last visit? Hospitalized since your last visit? No     2. Have you seen or consulted any other health care providers outside of the [address] [place] [address] since your last visit? Include any pap smears or colon screening.   No

## 2021-08-09 ENCOUNTER — OFFICE VISIT (OUTPATIENT)
Dept: URGENT CARE | Age: 31
End: 2021-08-09
Payer: COMMERCIAL

## 2021-08-09 VITALS — RESPIRATION RATE: 18 BRPM | OXYGEN SATURATION: 98 % | HEART RATE: 77 BPM | TEMPERATURE: 98.6 F

## 2021-08-09 DIAGNOSIS — Z20.822 SUSPECTED COVID-19 VIRUS INFECTION: Primary | ICD-10-CM

## 2021-08-09 PROCEDURE — 99202 OFFICE O/P NEW SF 15 MIN: CPT | Performed by: FAMILY MEDICINE

## 2021-08-09 NOTE — LETTER
2NOTIFICATION RETURN TO WORK / SCHOOL    8/9/2021 10:40 AM    Ms. Madison Alcala  [address]      To Whom It May Concern:    Madison Alcala is currently under the care of [facility]. She is Tested for Covid-19 and advised to quarantine until result comes back Negative. If there are questions or concerns please have the patient contact our office.         Sincerely,      Debbie Acharya MD

## 2021-08-09 NOTE — PROGRESS NOTES
This patient was seen at [address] [place] [address] Urgent Care while in their vehicle due to COVID-19 pandemic with PPE and focused examination in order to decrease community viral transmission. The patient/guardian gave verbal consent to treat. Not vaccinated     The history is provided by the patient. Sore Throat   The history is provided by the patient. This is a new problem. The current episode started yesterday. The problem has not changed since onset. There has been no fever. Associated symptoms include congestion, headaches and cough. Pertinent negatives include no plugged ear sensation, no shortness of breath and no trouble swallowing. She has tried nothing for the symptoms. Past Medical History:   Diagnosis Date    Allergic rhinitis, cause unspecified     Iron deficiency anemia     during pregnancy    Obesity, Class I, BMI 30-34.9     Spleen laceration, sequela 2019    SVT (supraventricular tachycardia) ([facility] [address] 2019        Past Surgical History:   Procedure Laterality Date    HX  SECTION      for fetal distress (Dr. Alexandria Devine)         History reviewed. No pertinent family history. Social History     Socioeconomic History    Marital status: UNKNOWN     Spouse name: Not on file    Number of children: 1    Years of education: Not on file    Highest education level: Not on file   Occupational History    Occupation: [employer] -    Tobacco Use    Smoking status: Never Smoker    Smokeless tobacco: Never Used   Substance and Sexual Activity    Alcohol use: Yes     Comment: social    Drug use: No    Sexual activity: Yes     Partners: Male     Birth control/protection: I.U.D.    Other Topics Concern    Not on file   Social History Narrative    Not on file     Social Determinants of Health     Financial Resource Strain:     Difficulty of Paying Living Expenses:    Food Insecurity:     Worried About Running Out of Food in the Last Year:     Ran Out of Food in the Last Year:    Transportation Needs:     Lack of Transportation (Medical):  Lack of Transportation (Non-Medical):    Physical Activity:     Days of Exercise per Week:     Minutes of Exercise per Session:    Stress:     Feeling of Stress :    Social Connections:     Frequency of Communication with Friends and Family:     Frequency of Social Gatherings with Friends and Family:     Attends [employer] Services:     Active Member of Clubs or Organizations:     Attends Club or Organization Meetings:     Marital Status:    Intimate Partner Violence:     Fear of Current or Ex-Partner:     Emotionally Abused:     Physically Abused:     Sexually Abused: ALLERGIES: Penicillins    Review of Systems   Constitutional: Positive for fatigue. Negative for chills and fever. HENT: Positive for congestion and sore throat. Negative for trouble swallowing. Respiratory: Positive for cough. Negative for shortness of breath. Neurological: Positive for headaches. All other systems reviewed and are negative. Vitals:    08/09/21 1033   Pulse: 77   Resp: 18   Temp: 98.6 °F (37 °C)   SpO2: 98%       Physical Exam  Vitals and nursing note reviewed. Constitutional:       General: She is not in acute distress. Appearance: She is not ill-appearing. Pulmonary:      Effort: Pulmonary effort is normal. No respiratory distress. Breath sounds: No wheezing. MDM    Procedures        ICD-10-CM ICD-9-CM    1. Suspected COVID-19 virus infection  Z20.822 V01.79 NOVEL CORONAVIRUS (COVID-19)     No orders of the defined types were placed in this encounter. No results found for any visits on 08/09/21. The patients condition was discussed with the patient and they understand. The patient is to follow up with primary care doctor. If signs and symptoms become worse the pt is to go to the ER. The patient is to take medications as prescribed.

## 2021-08-09 NOTE — LETTER
August 9, 2021  Keisha Grandeestrellita   [address]    Dear Cailin Sidhu:  Thank you for requesting access to [employer]. Please follow the instructions below to securely access and download your online medical record. [employer] allows you to send messages to your doctor, view your test results, renew your prescriptions, schedule appointments, and more. How Do I Sign Up? 1. In your internet browser, go to [email or web address]. 2. Click on the First Time User? Click Here link in the Sign In box. You will see the New Member Sign Up page. 3. Enter your [employer] Access Code exactly as it appears below. You will not need to use this code after youve completed the sign-up process. If you do not sign up before the expiration date, you must request a new code. [employer] Access Code: [email or web address]  Expires: 9/23/2021  9:55 AM     4. Enter the last four digits of your Social Security Number (xxxx) and Date of Birth (mm/dd/yyyy) as indicated and click Submit. You will be taken to the next sign-up page. 5. Create a [employer] ID. This will be your [employer] login ID and cannot be changed, so think of one that is secure and easy to remember. 6. Create a [employer] password. You can change your password at any time. 7. Enter your Password Reset Question and Answer. This can be used at a later time if you forget your password. 8. Enter your e-mail address. You will receive e-mail notification when new information is available in [address] 9. Click Sign Up. You can now view and download portions of your medical record. 10. Click the Download Summary menu link to download a portable copy of your medical information. Additional Information    If you have questions, please visit the Frequently Asked Questions section of the [employer] website at [email or web address] Remember, [employer] is NOT to be used for urgent needs. For medical emergencies, dial 911.     Now available from your iPhone and Android!     Sincerely,   [employer]

## 2021-08-11 LAB
SARS-COV-2, NAA 2 DAY TAT: NORMAL
SARS-COV-2, NAA: DETECTED

## 2021-08-11 NOTE — PROGRESS NOTES
Patient Notified for positive Covid-19  Asymptomatic currently  Follow quarantine guideline as per CDC  Notify contacts to be tested if symptomatic    Advised to follow with PCP and go to ED if worsen   May return for repeat test on 19/20 for work

## 2022-03-19 PROBLEM — E66.01 OBESITY, MORBID (HCC): Status: ACTIVE | Noted: 2019-07-09

## 2022-03-19 PROBLEM — S36.039A SPLENIC LACERATION: Status: ACTIVE | Noted: 2019-06-28

## 2022-03-23 NOTE — PROGRESS NOTES
SURGERY PROGRESS NOTE      Admit Date: 2019        Subjective:     Patient feels good today, no pain.  + BM. Gretchen regular diet. Objective:     Visit Vitals  /67 (BP 1 Location: Right arm, BP Patient Position: At rest)   Pulse (!) 117   Temp 98.3 °F (36.8 °C)   Resp 20   Ht 5' 7\" (1.702 m)   Wt 274 lb (124.3 kg)   SpO2 95%   Breastfeeding? No   BMI 42.91 kg/m²        Temp (24hrs), Av.7 °F (37.1 °C), Min:97.6 °F (36.4 °C), Max:99.7 °F (37.6 °C)      701 -  1900  In: 240 [P.O.:240]  Out: 200 [Urine:200]  1901 -  0700  In: 7460 [P.O.:720; I.V.:1750]  Out: 1550 [Urine:1550]    Physical Exam:    General:  alert, cooperative, no distress, appears stated age   Abdomen: soft, bowel sounds active, non-tender     Hgb 11 -> 9.5 ans has been 9.5 over the last 12 hours  Assessment:     Active Problems:    Tachycardia (2012)      Overview: due to mild dehydration vs other      Splenic laceration (2019)    splenic laceration -  Hb stable, increasing. Ambulating well. Sinus tachycardia - cardiology started her on Corlanor. Need to watch her on this today. D/C home in am if gretchen new cardiac med. [Spouse] : spouse [FreeTextEntry1] : Excisional biopsy of face lesion.

## 2023-05-20 RX ORDER — ACEBUTOLOL HYDROCHLORIDE 200 MG/1
200 CAPSULE ORAL 2 TIMES DAILY
COMMUNITY
Start: 2019-07-09

## 2023-05-20 RX ORDER — SODIUM FLUORIDE 5 MG/G
GEL, DENTIFRICE DENTAL
COMMUNITY
Start: 2019-05-10